# Patient Record
Sex: FEMALE | Race: WHITE | Employment: FULL TIME | URBAN - METROPOLITAN AREA
[De-identification: names, ages, dates, MRNs, and addresses within clinical notes are randomized per-mention and may not be internally consistent; named-entity substitution may affect disease eponyms.]

---

## 2017-03-31 ENCOUNTER — TRANSFERRED RECORDS (OUTPATIENT)
Dept: HEALTH INFORMATION MANAGEMENT | Facility: CLINIC | Age: 62
End: 2017-03-31

## 2017-04-14 ENCOUNTER — TELEPHONE (OUTPATIENT)
Dept: FAMILY MEDICINE | Facility: CLINIC | Age: 62
End: 2017-04-14

## 2017-04-14 NOTE — TELEPHONE ENCOUNTER
Reason for Call:  Other call back    Detailed comments: Patient called stating that she needs a referral to see a Urologist from her primary provider in order for her insurance to cover it. She went to an OBGYN at the [facility] and saw Charis Strong who then referred her to Rosalba Acuña an Urologist at [facility]. Please call patient with answer on if she can get that referral or any other questions.     Phone Number Patient can be reached at: Cell number on file:    Telephone Information:   Mobile [phone]       Best Time: Anytime    Can we leave a detailed message on this number? YES    Call taken on 4/14/2017 at 3:54 PM by Melissa Person

## 2017-04-14 NOTE — TELEPHONE ENCOUNTER
Routing to referral specialist.    Shae Nava-Please review and follow up with patient.    Thank you!  PERCY CastanedaN, RN

## 2017-04-18 NOTE — TELEPHONE ENCOUNTER
Spoke with pt and stated Dr Rosalba Acuña at [facility] is out of network. Spoke with Dr Charis Saenz nurse at [facility] [place] and recommended Dr Minnie Bailey at [facility] [address] [place] [address] [address] [facility] [address] [phone] or fax number [phone]. Pt will cancel her [facility] appt and make an appt with Dr Bailey. Pt will call back if she needs an Order placed by Dr Will.

## 2017-06-26 ENCOUNTER — OFFICE VISIT (OUTPATIENT)
Dept: FAMILY MEDICINE | Facility: CLINIC | Age: 62
End: 2017-06-26
Payer: COMMERCIAL

## 2017-06-26 VITALS
RESPIRATION RATE: 12 BRPM | HEIGHT: 68 IN | WEIGHT: 139.25 LBS | HEART RATE: 81 BPM | TEMPERATURE: 98.1 F | OXYGEN SATURATION: 100 % | BODY MASS INDEX: 21.1 KG/M2 | DIASTOLIC BLOOD PRESSURE: 64 MMHG | SYSTOLIC BLOOD PRESSURE: 104 MMHG

## 2017-06-26 DIAGNOSIS — Z01.818 PREOP GENERAL PHYSICAL EXAM: Primary | ICD-10-CM

## 2017-06-26 DIAGNOSIS — J31.0 CHRONIC RHINITIS, UNSPECIFIED TYPE: ICD-10-CM

## 2017-06-26 DIAGNOSIS — L30.9 VULVAR DERMATITIS: ICD-10-CM

## 2017-06-26 DIAGNOSIS — N39.46 MIXED INCONTINENCE URGE AND STRESS (MALE)(FEMALE): ICD-10-CM

## 2017-06-26 DIAGNOSIS — N81.89 PELVIC FLOOR RELAXATION: ICD-10-CM

## 2017-06-26 LAB — HGB BLD-MCNC: 13 G/DL (ref 11.7–15.7)

## 2017-06-26 PROCEDURE — 36415 COLL VENOUS BLD VENIPUNCTURE: CPT | Performed by: FAMILY MEDICINE

## 2017-06-26 PROCEDURE — 85018 HEMOGLOBIN: CPT | Performed by: FAMILY MEDICINE

## 2017-06-26 PROCEDURE — 99214 OFFICE O/P EST MOD 30 MIN: CPT | Performed by: FAMILY MEDICINE

## 2017-06-26 RX ORDER — FLUTICASONE PROPIONATE 50 MCG
1-2 SPRAY, SUSPENSION (ML) NASAL DAILY
Qty: 16 G | Refills: 3 | Status: ON HOLD | OUTPATIENT
Start: 2017-06-26 | End: 2017-07-21

## 2017-06-26 RX ORDER — CLOBETASOL PROPIONATE 0.5 MG/G
CREAM TOPICAL
Qty: 30 G | Refills: 3 | Status: SHIPPED | OUTPATIENT
Start: 2017-06-26 | End: 2017-06-26

## 2017-06-26 RX ORDER — CLOBETASOL PROPIONATE 0.5 MG/G
CREAM TOPICAL
Qty: 30 G | Refills: 3 | Status: SHIPPED | OUTPATIENT
Start: 2017-06-26 | End: 2017-07-08

## 2017-06-26 NOTE — MR AVS SNAPSHOT
After Visit Summary   6/26/2017    Raven Mahmood    MRN: [id number]           Patient Information     Date Of Birth          1955        Visit Information        Provider Department      6/26/2017 9:20 AM Roshni Will MD [facility]        Today's Diagnoses     Preop general physical exam    -  1    Pelvic floor relaxation        Vulvar dermatitis        Chronic rhinitis, unspecified type          Care Instructions      Before Your Surgery      Call your surgeon if there is any change in your health. This includes signs of a cold or flu (such as a sore throat, runny nose, cough, rash or fever).    Do not smoke, drink alcohol or take over the counter medicine (unless your surgeon or primary care doctor tells you to) for the 24 hours before and after surgery.    If you take prescribed drugs: Follow your doctor s orders about which medicines to take and which to stop until after surgery.    Eating and drinking prior to surgery: follow the instructions from your surgeon    Take a shower or bath the night before surgery. Use the soap your surgeon gave you to gently clean your skin. If you do not have soap from your surgeon, use your regular soap. Do not shave or scrub the surgery site.  Wear clean pajamas and have clean sheets on your bed.           Follow-ups after your visit        Your next 10 appointments already scheduled     Jul 21, 2017   Procedure with Payam Ceballos MD   [facility] (--)    [address]  [facility] [id number] [phone]              Who to contact     If you have questions or need follow up information about today's clinic visit or your schedule please contact [facility] directly at [phone].  Normal or non-critical lab and imaging results will be communicated to you by MyChart, letter or phone within 4 business days after the clinic has received the results. If you do not hear from us within 7  "days, please contact the clinic through [employer] or phone. If you have a critical or abnormal lab result, we will notify you by phone as soon as possible.  Submit refill requests through [employer] or call your pharmacy and they will forward the refill request to us. Please allow 3 business days for your refill to be completed.          Additional Information About Your Visit        [employer] Information     [employer] gives you secure access to your electronic health record. If you see a primary care provider, you can also send messages to your care team and make appointments. If you have questions, please call your primary care clinic.  If you do not have a primary care provider, please call [phone] and they will assist you.        Care EveryWhere ID     This is your Care EveryWhere ID. This could be used by other organizations to access your [place] medical records  [id number]        Your Vitals Were     Pulse Temperature Respirations Height Last Period Pulse Oximetry    81 98.1  F (36.7  C) (Oral) 12 5' 8\" (1.727 m) 09/01/2005 100%    Breastfeeding? BMI (Body Mass Index)                No 21.17 kg/m2           Blood Pressure from Last 3 Encounters:   06/26/17 104/64   12/01/16 95/63   11/10/16 108/66    Weight from Last 3 Encounters:   06/26/17 139 lb 4 oz (63.2 kg)   11/10/16 140 lb (63.5 kg)   07/08/16 136 lb (61.7 kg)              We Performed the Following     Hemoglobin          Today's Medication Changes          These changes are accurate as of: 6/26/17 10:22 AM.  If you have any questions, ask your nurse or doctor.               Start taking these medicines.        Dose/Directions    clobetasol 0.05 % cream   Commonly known as:  TEMOVATE   Used for:  Vulvar dermatitis   Started by:  Roshni Will MD        Apply  topically 2 times daily.   Quantity:  30 g   Refills:  3         Stop taking these medicines if you haven't already. Please contact your care team if you have questions.     CREAM BASE EX "   Stopped by:  Roshni Will MD           omeprazole 20 MG CR capsule   Commonly known as:  priLOSEC   Stopped by:  Roshni Will MD                Where to get your medicines      These medications were sent to [facility] [address] [facility] [address] AT [facility] [address], [facility] [address]     Phone:  [phone]     fluticasone 50 MCG/ACT spray         Call your pharmacy to confirm that your medication is ready for pickup. It may take up to 24 hours for them to receive the prescription. If the prescription is not ready within 3 business days, please contact your clinic or your provider.     We will let you know when these medications are ready. If you don't hear back within 3 business days, please contact us.     clobetasol 0.05 % cream                Primary Care Provider Office Phone # Fax #    Roshni Will [phone]       [facility] [address]  [facility] [address]        Equal Access to Services     [facility]: Hadii radha ku hadasho Soomaali, waaxda luqadaha, qaybta kaalmada aderuy, marybeth cerrato . So [place] -[phone].    ATENCIÓN: Si habla español, tiene a jones disposición servicios gratuitos de asistencia lingüística. Galo al [phone].    We comply with applicable federal civil rights laws and [place] laws. We do not discriminate on the basis of race, color, national origin, age, disability sex, sexual orientation or gender identity.            Thank you!     Thank you for choosing [facility]  for your care. Our goal is always to provide you with excellent care. Hearing back from our patients is one way we can continue to improve our services. Please take a few minutes to complete the written survey that you may receive in the mail after your visit with us. Thank you!             Your Updated Medication List - Protect others around you: Learn how  to safely use, store and throw away your medicines at www.disposemymeds.org.          This list is accurate as of: 6/26/17 10:22 AM.  Always use your most recent med list.                   Brand Name Dispense Instructions for use Diagnosis    clobetasol 0.05 % cream    TEMOVATE    30 g    Apply  topically 2 times daily.    Vulvar dermatitis       fluticasone 50 MCG/ACT spray    FLONASE    16 g    Spray 1-2 sprays into both nostrils daily    Chronic rhinitis, unspecified type

## 2017-06-26 NOTE — PROGRESS NOTES
[facility]  [address] [place] [id number] [phone]  Dept: [phone]    PRE-OP EVALUATION:  Today's date: 2017    Raven Mahmood (: 1955) presents for pre-operative evaluation assessment as requested by Payam Andrea MD (Primary), Shae Orozco MD (assisting) .  She requires evaluation and anesthesia risk assessment prior to undergoing surgery/procedure for treatment of prolapsed uterus.  Proposed procedure: ROBOTIC ASSISTED SUPRACERVICAL LAPAROSCOPIC HYSTERECTOMY, BILATERAL SALPINGO-OOPHORECTOMIES (DR. WINSTON) ROBOTIC ASSISTED SACROCOLPOPEXY, MIDURETHRAL SLING, CYSTOSCOPY (SANJIV)    Date of Surgery/ Procedure: 2017  Time of Surgery/ Procedure: 1:00 PM  Hospital/Surgical Facility: [facility]  Fax number for surgical facility: n/a  Primary Physician: Roshni Will  Type of Anesthesia Anticipated: General    Patient has a Health Care Directive or Living Will:  YES,  only    Preop Questions 2017   1.  Do you have a history of heart attack, stroke, stent, bypass or surgery on an artery in the head, neck, heart or legs? No   2.  Do you ever have any pain or discomfort in your chest? No   3.  Do you have a history of  Heart Failure? No   4.   Are you troubled by shortness of breath when:  walking on a level surface, or up a slight hill, or at night? No   5.  Do you currently have a cold, bronchitis or other respiratory infection? No   6.  Do you have a cough, shortness of breath, or wheezing? No   7.  Do you sometimes get pains in the calves of your legs when you walk? No   8. Do you or anyone in your family have previous history of blood clots? No   9.  Do you or does anyone in your family have a serious bleeding problem such as prolonged bleeding following surgeries or cuts? No   10. Have you ever had problems with anemia or been told to take iron pills? No   11. Have you had any abnormal blood loss such as black,  tarry or bloody stools, or abnormal vaginal bleeding? No   12. Have you ever had a blood transfusion? No   13. Have you or any of your relatives ever had problems with anesthesia? No   14. Do you have sleep apnea, excessive snoring or daytime drowsiness? No   15. Do you have any prosthetic heart valves? No   16. Do you have prosthetic joints? No   17. Is there any chance that you may be pregnant? No         HPI:                                                      Brief HPI related to upcoming procedure: has tried pessaries,       See problem list for active medical problems.  Problems all longstanding and stable, except as noted/documented.  See ROS for pertinent symptoms related to these conditions.                                                                                                  .    MEDICAL HISTORY:                                                      Patient Active Problem List    Diagnosis Date Noted     Pelvic floor relaxation      Priority: Medium     pessary       Chronic rhinitis 07/08/2016     Priority: Medium     Mucous polyp of cervix      Priority: Medium     cervical polyp 12:00, seen by GYN       Elizabeth      Priority: Medium     DEXA       Asymptomatic postmenopausal status 08/03/2006     Priority: Medium     Problem list name updated by automated process. Provider to review       Mixed incontinence urge and stress (male)(female) 08/03/2006     Priority: Medium      Past Medical History:   Diagnosis Date     Dysplasia of cervix, unspecified     cervical dysplasia - in [facility]     Female stress incontinence     mild stress incontinence     Hemorrhoids      Irritable bowel syndrome      Mucous polyp of cervix     cervical polyp 12:00, seen by GYN     Osteopenia 8/06    DEXA     Other specified temporomandibular joint disorders      Pelvic floor relaxation     pessary     Tinnitus 11/06    Dr. Conti     Past Surgical History:   Procedure Laterality Date     COLONOSCOPY  12/17/07     WNL, sister with polyps     COLONOSCOPY  1/17/2013    Procedure: COLONOSCOPY;  COLONOSCOPY;  Surgeon: Roland Gee MD;  Location:  GI     ESOPHAGOSCOPY, GASTROSCOPY, DUODENOSCOPY (EGD), COMBINED N/A 12/1/2016    Procedure: COMBINED ESOPHAGOSCOPY, GASTROSCOPY, DUODENOSCOPY (EGD), BIOPSY SINGLE OR MULTIPLE;  Surgeon: Sergio Nguyễn MD;  Location:  GI     STRESS ECHO (METRO)  5/03    negative, at [facility]     Current Outpatient Prescriptions   Medication Sig Dispense Refill     fluticasone (FLONASE) 50 MCG/ACT spray Spray 1-2 sprays into both nostrils daily 16 g 3     clobetasol (TEMOVATE) 0.05 % cream Apply  topically 2 times daily. 30 g 3     OTC products: None, except as noted above    Allergies   Allergen Reactions     Erythromycin GI Disturbance     Penicillins Swelling and Rash     Tetracycline Rash      Latex Allergy: NO    Social History   Substance Use Topics     Smoking status: Never Smoker     Smokeless tobacco: Never Used     Alcohol use Yes      Comment: 2 drinks a week     History   Drug Use No       REVIEW OF SYSTEMS:                                                      CONST: NEGATIVE for fevers/chills/sweats, unexplained weight loss/gain, and fatigue  EYES: NEGATIVE for change in vision  ENT: NEGATIVE for difficulty hearing/tinnitus, and problems with teeth/gums  BREAST: NEGATIVE for breast lump/discharge  CV: NEGATIVE for chest pain/discomfort, leg pain with exercise, and palpitations  RESP: NEGATIVE for cough/wheeze, and difficulty breathing  GI: NEGATIVE for abdominal pain, blood in bowel movement, and nausea/vomiting/diarrhea  : NEGATIVE for nighttime urination, unusual vaginal bleeding, penile/vaginal discharge, and concerns about sexual function and POSITIVE for leaking urine  MS: NEGATIVE for muscle/joint pain  SKIN: NEGATIVE for rash or mole change  NEURO: POSITIVE for headaches, NEGATIVE for dizziness/lightheadedness, numbness, memory loss, and loss of  "coordination  PSYCH: NEGATIVE for anxiety/stress, problems with sleep, and depression  HEME: NEGATIVE for unexplained lumps, and easy bruising/bleeding  ENDO: NEGATIVE for excessive thirst or urination  ALL: NEGATIVE for hay fever/allergies     EXAM:                                                    /64 (BP Location: Right arm, Patient Position: Chair, Cuff Size: Adult Regular)  Pulse 81  Temp 98.1  F (36.7  C) (Oral)  Resp 12  Ht 5' 8\" (1.727 m)  Wt 139 lb 4 oz (63.2 kg)  LMP 09/01/2005  SpO2 100%  Breastfeeding? No  BMI 21.17 kg/m2  GEN:  no apparent distress  EYES: PERRL, conjunctivae and sclerae clear   ENT: external ears and nose without lesions or scars, TM's and canals clear bilaterally, oropharynx clear with moist mucus membranes and normal landmarks and lips, teeth, and gums with no abnormalities noted   NECK:  Supple without adenopathy, mass, or thyromegaly   LUNGS:  normal respiratory effort, and lungs clear to auscultation bilaterally - no rales, rhonchi or wheezes  CV: regular rate and rhythm, normal S1 S2, no S3 or S4, no murmur, click or rub and bilateral lower extremities without edema   BREASTS: normal without masses, tenderness or nipple discharge and no palpable axillary masses or adenopathy   ABD:  soft, nontender, no mass, no hepatosplenomegaly, no hernias  SKIN:  normal to inspection and palpation, no rashes or abnormal-appearing lesions   PSYCH: mood/affect appear normal/bright     DIAGNOSTICS:                                                    EKG: Not indicated due to non-vascular surgery and low risk of event (age <65 and without cardiac risk factors)    Results for orders placed or performed in visit on 06/26/17   Hemoglobin   Result Value Ref Range    Hemoglobin 13.0 11.7 - 15.7 g/dL        Recent Labs   Lab Test  11/10/16   0953  05/30/10   1930   HGB  13.0  12.6   PLT  193  182   NA  144  140   POTASSIUM  4.2  4.0   CR  0.81  0.87        IMPRESSION:                       "                              Reason for surgery/procedure: symptomatic pelvic relaxation  Diagnosis/reason for consult: preoperative assessment    The proposed surgical procedure is considered INTERMEDIATE risk.    REVISED CARDIAC RISK INDEX  The patient has the following serious cardiovascular risks for perioperative complications such as (MI, PE, VFib and 3  AV Block):  No serious cardiac risks  INTERPRETATION: 0 risks: Class I (very low risk - 0.4% complication rate)    The patient has the following additional risks for perioperative complications:  No identified additional risks      ICD-10-CM    1. Preop general physical exam Z01.818 Hemoglobin   2. Pelvic floor relaxation N81.89 Hemoglobin   3. Mixed incontinence urge and stress (male)(female) N39.46 Hemoglobin   4. Chronic rhinitis, unspecified type J31.0 fluticasone (FLONASE) 50 MCG/ACT spray   5. Vulvar dermatitis N76.89 clobetasol (TEMOVATE) 0.05 % cream     DISCONTINUED: clobetasol (TEMOVATE) 0.05 % cream       RECOMMENDATIONS:                                                          APPROVAL GIVEN to proceed with proposed procedure, without further diagnostic evaluation       Signed Electronically by: Roshni Will MD    Copy of this evaluation report is provided to requesting physician.    June Preop Guidelines

## 2017-06-26 NOTE — NURSING NOTE
"Chief Complaint   Patient presents with     Pre-Op Exam       Initial /64 (BP Location: Right arm, Patient Position: Chair, Cuff Size: Adult Regular)  Pulse 81  Temp 98.1  F (36.7  C) (Oral)  Resp 12  Ht 5' 8\" (1.727 m)  Wt 139 lb 4 oz (63.2 kg)  LMP 09/01/2005  SpO2 100%  Breastfeeding? No  BMI 21.17 kg/m2 Estimated body mass index is 21.17 kg/(m^2) as calculated from the following:    Height as of this encounter: 5' 8\" (1.727 m).    Weight as of this encounter: 139 lb 4 oz (63.2 kg).  Medication Reconciliation: complete     Cookie Brewer MA      "

## 2017-06-28 ENCOUNTER — TELEPHONE (OUTPATIENT)
Dept: FAMILY MEDICINE | Facility: CLINIC | Age: 62
End: 2017-06-28

## 2017-06-28 ENCOUNTER — MYC MEDICAL ADVICE (OUTPATIENT)
Dept: FAMILY MEDICINE | Facility: CLINIC | Age: 62
End: 2017-06-28

## 2017-06-28 DIAGNOSIS — L30.9 VULVAR DERMATITIS: Primary | ICD-10-CM

## 2017-06-28 DIAGNOSIS — Z01.818 PRE-OP EXAM: Primary | ICD-10-CM

## 2017-06-28 DIAGNOSIS — N81.89 PELVIC FLOOR RELAXATION: ICD-10-CM

## 2017-06-28 NOTE — TELEPHONE ENCOUNTER
OK, it sounds like her surgeon is requesting preop EKG.  I put order in.  She can schedule nurse-only appointment for that.  Roshni Will MD

## 2017-06-28 NOTE — TELEPHONE ENCOUNTER
Started PA on covermymeds. Com    Key code: [id number]    It may take 1-5 business day to get approved or denied.

## 2017-06-28 NOTE — TELEPHONE ENCOUNTER
Received Fax from [facility] with the following message: Patient's pharmacy coverage with [employer] has , contact insurance plan at the number on the back of patient's most current insurance card.    Called number on the back of insurance card, representative stated patient has active insurance coverage, representative stated PA must be submitted via [employer] @ [phone].  Please Call [employer] to initiate PA.      Cecile Lei, CMA

## 2017-06-28 NOTE — TELEPHONE ENCOUNTER
Reason for Call: Request for an order or referral:    Order or referral being requested:EKG    Date needed: at your convenience    Has the patient been seen by the PCP for this problem? YES    Additional comments: Pt stated she saw Dr. Will for a preop recently 6/26. During the visit they had talked about an EKG not being necessary. Pt was reviewing her print outs and it said that she does need to have one done. Please see if Dr. Will can place an order for an EKG and please follow up with pt to schedule an appointment when the order has been placed, thanks!    Phone number Patient can be reached at:  Home number on file [phone] (home)    Best Time:  anytime    Can we leave a detailed message on this number?  YES    Call taken on 6/28/2017 at 2:12 PM by Shae Braswell

## 2017-06-28 NOTE — TELEPHONE ENCOUNTER
--Please see patient's request in this encounter.      --  --Please call patient and ask schedule EKG when order placed.     Thank you,  Ivette Arzola RN

## 2017-06-29 NOTE — TELEPHONE ENCOUNTER
Pa sent through [facility]. Copied for chart.   Your information has been submitted to [employer].  If [employer] has not responded in 3-5 business days or if you have any questions about your PA submission, contact [employer] at [phone]      [employer] (Key: [id number])  Clobetasol Propionate 0.05% cream  Status: PA Request  Sent: June 29th, 2017    Hitesh Pepe MA

## 2017-06-30 ENCOUNTER — TELEPHONE (OUTPATIENT)
Dept: FAMILY MEDICINE | Facility: CLINIC | Age: 62
End: 2017-06-30

## 2017-06-30 ENCOUNTER — OFFICE VISIT (OUTPATIENT)
Dept: NURSING | Facility: CLINIC | Age: 62
End: 2017-06-30
Payer: COMMERCIAL

## 2017-06-30 DIAGNOSIS — Z00.8 ENCOUNTER FOR ELECTROCARDIOGRAM: Primary | ICD-10-CM

## 2017-06-30 DIAGNOSIS — Z01.818 PRE-OP EXAM: ICD-10-CM

## 2017-06-30 DIAGNOSIS — N81.89 PELVIC FLOOR RELAXATION: ICD-10-CM

## 2017-06-30 PROCEDURE — 93000 ELECTROCARDIOGRAM COMPLETE: CPT

## 2017-06-30 PROCEDURE — 99207 ZZC NO CHARGE NURSE ONLY: CPT

## 2017-06-30 NOTE — TELEPHONE ENCOUNTER
Reason for Call:  Form, our goal is to have forms completed with 72 hours, however, some forms may require a visit or additional information.    Type of letter, form or note:  medical    Who is the form from?: [employer] (if other please explain)    Where did the form come from: form was mailed in    What clinic location was the form placed at?: [facility]    Where the form was placed: 's Box    What number is listed as a contact on the form?: N/A       Additional comments: Fax to [phone]    Call taken on 6/30/2017 at 9:44 AM by Melissa Lundberg

## 2017-06-30 NOTE — NURSING NOTE
EKG was performed by Gordon Ledesma MA and result was review by Liana Spangler PA-C. Results normal.     Gordon Ledesma MA

## 2017-06-30 NOTE — TELEPHONE ENCOUNTER
"Surgeons should be able to visualized EKG through [employer] system - we can see their preop already.  Thanks  Liana \"Vishnu\" VICTOR M Spangler   "

## 2017-06-30 NOTE — TELEPHONE ENCOUNTER
EKG was performed by Gordon Ledesma MA and result was review by Liana Spangler PA-C. Result was normal clear for surgery.      Not sure if these results will need to be faxed with pre-op visit to JAYDEN Reynaga again.         Gordon Ledesma MA

## 2017-06-30 NOTE — PROGRESS NOTES
"Results discussed with patient at time of visit. No acute changes or concerns for proposed surgery.  Liana \"Vishnu\" VICTOR M Spangler"

## 2017-07-07 RX ORDER — HALOBETASOL PROPIONATE 0.5 MG/G
CREAM TOPICAL 2 TIMES DAILY
Qty: 50 G | Refills: 1 | Status: CANCELLED | OUTPATIENT
Start: 2017-07-07

## 2017-07-07 NOTE — TELEPHONE ENCOUNTER
Dr. Will asked writer to contact patient and ask patient if she wants to try either of the following high potency creams: Halobetasol or Augmented Betamethasone?      Prior authorization for Clobetasol propionate denied.    If so, med to be pended and encounter sent back to Dr. Will.    Writer called patient and explained prior auth denial and other options on formulary.    Patient verbalized understanding and stated she will try either on-formulary option, whichever one is closest to dose she would use for clobetasol propionate.    Patient aware Dr. Will back in clinic on 7/10/17.    Pharmacy confirmed with patient.    Prior auth denial letter placed in [employer] abstraction basket.    Dr. Will-Please review.  Writer pended both medications.    Thank you!  ADRIA Castro, PERCYN, RN

## 2017-07-08 RX ORDER — BETAMETHASONE DIPROPIONATE 0.5 MG/G
CREAM TOPICAL
Qty: 50 G | Refills: 0 | Status: ON HOLD | OUTPATIENT
Start: 2017-07-08 | End: 2017-07-21

## 2017-07-10 NOTE — TELEPHONE ENCOUNTER
I called patient to let her know script sent and she says she did already get a call.    Ivette Arzola RN

## 2017-07-18 PROBLEM — N81.4 UTERINE PROLAPSE: Status: ACTIVE | Noted: 2017-07-18

## 2017-07-18 PROBLEM — N81.11 MIDLINE CYSTOCELE: Status: ACTIVE | Noted: 2017-07-18

## 2017-07-18 NOTE — H&P (VIEW-ONLY)
[facility]  [address] [place] [id number] [phone]  Dept: [phone]    PRE-OP EVALUATION:  Today's date: 2017    Raven Mahmood (: 1955) presents for pre-operative evaluation assessment as requested by Payam Andrea MD (Primary), Shae Orozco MD (assisting) .  She requires evaluation and anesthesia risk assessment prior to undergoing surgery/procedure for treatment of prolapsed uterus.  Proposed procedure: ROBOTIC ASSISTED SUPRACERVICAL LAPAROSCOPIC HYSTERECTOMY, BILATERAL SALPINGO-OOPHORECTOMIES (DR. WINSTON) ROBOTIC ASSISTED SACROCOLPOPEXY, MIDURETHRAL SLING, CYSTOSCOPY (SANJIV)    Date of Surgery/ Procedure: 2017  Time of Surgery/ Procedure: 1:00 PM  Hospital/Surgical Facility: [facility]  Fax number for surgical facility: n/a  Primary Physician: Roshni Will  Type of Anesthesia Anticipated: General    Patient has a Health Care Directive or Living Will:  YES,  only    Preop Questions 2017   1.  Do you have a history of heart attack, stroke, stent, bypass or surgery on an artery in the head, neck, heart or legs? No   2.  Do you ever have any pain or discomfort in your chest? No   3.  Do you have a history of  Heart Failure? No   4.   Are you troubled by shortness of breath when:  walking on a level surface, or up a slight hill, or at night? No   5.  Do you currently have a cold, bronchitis or other respiratory infection? No   6.  Do you have a cough, shortness of breath, or wheezing? No   7.  Do you sometimes get pains in the calves of your legs when you walk? No   8. Do you or anyone in your family have previous history of blood clots? No   9.  Do you or does anyone in your family have a serious bleeding problem such as prolonged bleeding following surgeries or cuts? No   10. Have you ever had problems with anemia or been told to take iron pills? No   11. Have you had any abnormal blood loss such as black,  tarry or bloody stools, or abnormal vaginal bleeding? No   12. Have you ever had a blood transfusion? No   13. Have you or any of your relatives ever had problems with anesthesia? No   14. Do you have sleep apnea, excessive snoring or daytime drowsiness? No   15. Do you have any prosthetic heart valves? No   16. Do you have prosthetic joints? No   17. Is there any chance that you may be pregnant? No         HPI:                                                      Brief HPI related to upcoming procedure: has tried pessaries,       See problem list for active medical problems.  Problems all longstanding and stable, except as noted/documented.  See ROS for pertinent symptoms related to these conditions.                                                                                                  .    MEDICAL HISTORY:                                                      Patient Active Problem List    Diagnosis Date Noted     Pelvic floor relaxation      Priority: Medium     pessary       Chronic rhinitis 07/08/2016     Priority: Medium     Mucous polyp of cervix      Priority: Medium     cervical polyp 12:00, seen by GYN       Elizabeth      Priority: Medium     DEXA       Asymptomatic postmenopausal status 08/03/2006     Priority: Medium     Problem list name updated by automated process. Provider to review       Mixed incontinence urge and stress (male)(female) 08/03/2006     Priority: Medium      Past Medical History:   Diagnosis Date     Dysplasia of cervix, unspecified     cervical dysplasia - in [facility]     Female stress incontinence     mild stress incontinence     Hemorrhoids      Irritable bowel syndrome      Mucous polyp of cervix     cervical polyp 12:00, seen by GYN     Osteopenia 8/06    DEXA     Other specified temporomandibular joint disorders      Pelvic floor relaxation     pessary     Tinnitus 11/06    Dr. Conti     Past Surgical History:   Procedure Laterality Date     COLONOSCOPY  12/17/07     WNL, sister with polyps     COLONOSCOPY  1/17/2013    Procedure: COLONOSCOPY;  COLONOSCOPY;  Surgeon: Roland Gee MD;  Location:  GI     ESOPHAGOSCOPY, GASTROSCOPY, DUODENOSCOPY (EGD), COMBINED N/A 12/1/2016    Procedure: COMBINED ESOPHAGOSCOPY, GASTROSCOPY, DUODENOSCOPY (EGD), BIOPSY SINGLE OR MULTIPLE;  Surgeon: Sergio Nguyễn MD;  Location:  GI     STRESS ECHO (METRO)  5/03    negative, at [facility]     Current Outpatient Prescriptions   Medication Sig Dispense Refill     fluticasone (FLONASE) 50 MCG/ACT spray Spray 1-2 sprays into both nostrils daily 16 g 3     clobetasol (TEMOVATE) 0.05 % cream Apply  topically 2 times daily. 30 g 3     OTC products: None, except as noted above    Allergies   Allergen Reactions     Erythromycin GI Disturbance     Penicillins Swelling and Rash     Tetracycline Rash      Latex Allergy: NO    Social History   Substance Use Topics     Smoking status: Never Smoker     Smokeless tobacco: Never Used     Alcohol use Yes      Comment: 2 drinks a week     History   Drug Use No       REVIEW OF SYSTEMS:                                                      CONST: NEGATIVE for fevers/chills/sweats, unexplained weight loss/gain, and fatigue  EYES: NEGATIVE for change in vision  ENT: NEGATIVE for difficulty hearing/tinnitus, and problems with teeth/gums  BREAST: NEGATIVE for breast lump/discharge  CV: NEGATIVE for chest pain/discomfort, leg pain with exercise, and palpitations  RESP: NEGATIVE for cough/wheeze, and difficulty breathing  GI: NEGATIVE for abdominal pain, blood in bowel movement, and nausea/vomiting/diarrhea  : NEGATIVE for nighttime urination, unusual vaginal bleeding, penile/vaginal discharge, and concerns about sexual function and POSITIVE for leaking urine  MS: NEGATIVE for muscle/joint pain  SKIN: NEGATIVE for rash or mole change  NEURO: POSITIVE for headaches, NEGATIVE for dizziness/lightheadedness, numbness, memory loss, and loss of  "coordination  PSYCH: NEGATIVE for anxiety/stress, problems with sleep, and depression  HEME: NEGATIVE for unexplained lumps, and easy bruising/bleeding  ENDO: NEGATIVE for excessive thirst or urination  ALL: NEGATIVE for hay fever/allergies     EXAM:                                                    /64 (BP Location: Right arm, Patient Position: Chair, Cuff Size: Adult Regular)  Pulse 81  Temp 98.1  F (36.7  C) (Oral)  Resp 12  Ht 5' 8\" (1.727 m)  Wt 139 lb 4 oz (63.2 kg)  LMP 09/01/2005  SpO2 100%  Breastfeeding? No  BMI 21.17 kg/m2  GEN:  no apparent distress  EYES: PERRL, conjunctivae and sclerae clear   ENT: external ears and nose without lesions or scars, TM's and canals clear bilaterally, oropharynx clear with moist mucus membranes and normal landmarks and lips, teeth, and gums with no abnormalities noted   NECK:  Supple without adenopathy, mass, or thyromegaly   LUNGS:  normal respiratory effort, and lungs clear to auscultation bilaterally - no rales, rhonchi or wheezes  CV: regular rate and rhythm, normal S1 S2, no S3 or S4, no murmur, click or rub and bilateral lower extremities without edema   BREASTS: normal without masses, tenderness or nipple discharge and no palpable axillary masses or adenopathy   ABD:  soft, nontender, no mass, no hepatosplenomegaly, no hernias  SKIN:  normal to inspection and palpation, no rashes or abnormal-appearing lesions   PSYCH: mood/affect appear normal/bright     DIAGNOSTICS:                                                    EKG: Not indicated due to non-vascular surgery and low risk of event (age <65 and without cardiac risk factors)    Results for orders placed or performed in visit on 06/26/17   Hemoglobin   Result Value Ref Range    Hemoglobin 13.0 11.7 - 15.7 g/dL        Recent Labs   Lab Test  11/10/16   0953  05/30/10   1930   HGB  13.0  12.6   PLT  193  182   NA  144  140   POTASSIUM  4.2  4.0   CR  0.81  0.87        IMPRESSION:                       "                              Reason for surgery/procedure: symptomatic pelvic relaxation  Diagnosis/reason for consult: preoperative assessment    The proposed surgical procedure is considered INTERMEDIATE risk.    REVISED CARDIAC RISK INDEX  The patient has the following serious cardiovascular risks for perioperative complications such as (MI, PE, VFib and 3  AV Block):  No serious cardiac risks  INTERPRETATION: 0 risks: Class I (very low risk - 0.4% complication rate)    The patient has the following additional risks for perioperative complications:  No identified additional risks      ICD-10-CM    1. Preop general physical exam Z01.818 Hemoglobin   2. Pelvic floor relaxation N81.89 Hemoglobin   3. Mixed incontinence urge and stress (male)(female) N39.46 Hemoglobin   4. Chronic rhinitis, unspecified type J31.0 fluticasone (FLONASE) 50 MCG/ACT spray   5. Vulvar dermatitis N76.89 clobetasol (TEMOVATE) 0.05 % cream     DISCONTINUED: clobetasol (TEMOVATE) 0.05 % cream       RECOMMENDATIONS:                                                          APPROVAL GIVEN to proceed with proposed procedure, without further diagnostic evaluation       Signed Electronically by: Roshni Will MD    Copy of this evaluation report is provided to requesting physician.    June Preop Guidelines

## 2017-07-20 RX ORDER — CLOBETASOL PROPIONATE 0.5 MG/G
CREAM TOPICAL 2 TIMES DAILY
Status: ON HOLD | COMMUNITY
End: 2017-07-21

## 2017-07-21 ENCOUNTER — HOSPITAL ENCOUNTER (OUTPATIENT)
Facility: CLINIC | Age: 62
Discharge: HOME | End: 2017-07-22
Attending: OBSTETRICS & GYNECOLOGY | Admitting: OBSTETRICS & GYNECOLOGY
Payer: COMMERCIAL

## 2017-07-21 ENCOUNTER — ANESTHESIA EVENT (OUTPATIENT)
Dept: SURGERY | Facility: CLINIC | Age: 62
End: 2017-07-21
Payer: COMMERCIAL

## 2017-07-21 ENCOUNTER — ANESTHESIA (OUTPATIENT)
Dept: SURGERY | Facility: CLINIC | Age: 62
End: 2017-07-21
Payer: COMMERCIAL

## 2017-07-21 DIAGNOSIS — N81.4 UTERINE PROLAPSE: Primary | ICD-10-CM

## 2017-07-21 LAB
ABO + RH BLD: NORMAL
ABO + RH BLD: NORMAL
BLD GP AB SCN SERPL QL: NORMAL
BLOOD BANK CMNT PATIENT-IMP: NORMAL
ERYTHROCYTE [DISTWIDTH] IN BLOOD BY AUTOMATED COUNT: 13.2 % (ref 10–15)
HCT VFR BLD AUTO: 37.6 % (ref 35–47)
HGB BLD-MCNC: 12.8 G/DL (ref 11.7–15.7)
MCH RBC QN AUTO: 32.7 PG (ref 26.5–33)
MCHC RBC AUTO-ENTMCNC: 34 G/DL (ref 31.5–36.5)
MCV RBC AUTO: 96 FL (ref 78–100)
PLATELET # BLD AUTO: 210 10E9/L (ref 150–450)
RBC # BLD AUTO: 3.91 10E12/L (ref 3.8–5.2)
SPECIMEN EXP DATE BLD: NORMAL
WBC # BLD AUTO: 3.9 10E9/L (ref 4–11)

## 2017-07-21 PROCEDURE — 36415 COLL VENOUS BLD VENIPUNCTURE: CPT | Performed by: OBSTETRICS & GYNECOLOGY

## 2017-07-21 PROCEDURE — 88305 TISSUE EXAM BY PATHOLOGIST: CPT | Performed by: OBSTETRICS & GYNECOLOGY

## 2017-07-21 PROCEDURE — 25000128 H RX IP 250 OP 636: Performed by: NURSE ANESTHETIST, CERTIFIED REGISTERED

## 2017-07-21 PROCEDURE — 27210794 ZZH OR GENERAL SUPPLY STERILE: Performed by: OBSTETRICS & GYNECOLOGY

## 2017-07-21 PROCEDURE — 88307 TISSUE EXAM BY PATHOLOGIST: CPT | Mod: 26 | Performed by: OBSTETRICS & GYNECOLOGY

## 2017-07-21 PROCEDURE — 25000125 ZZHC RX 250: Performed by: UROLOGY

## 2017-07-21 PROCEDURE — 40000935 ZZH STATISTIC OUTPATIENT (NON-OBS) EVE

## 2017-07-21 PROCEDURE — 36000087 ZZH SURGERY LEVEL 8 EA 15 ADDTL MIN: Performed by: OBSTETRICS & GYNECOLOGY

## 2017-07-21 PROCEDURE — 25000128 H RX IP 250 OP 636: Performed by: ANESTHESIOLOGY

## 2017-07-21 PROCEDURE — 85027 COMPLETE CBC AUTOMATED: CPT | Performed by: OBSTETRICS & GYNECOLOGY

## 2017-07-21 PROCEDURE — 25800025 ZZH RX 258: Performed by: OBSTETRICS & GYNECOLOGY

## 2017-07-21 PROCEDURE — 71000012 ZZH RECOVERY PHASE 1 LEVEL 1 FIRST HR: Performed by: OBSTETRICS & GYNECOLOGY

## 2017-07-21 PROCEDURE — 71000013 ZZH RECOVERY PHASE 1 LEVEL 1 EA ADDTL HR: Performed by: OBSTETRICS & GYNECOLOGY

## 2017-07-21 PROCEDURE — 37000008 ZZH ANESTHESIA TECHNICAL FEE, 1ST 30 MIN: Performed by: OBSTETRICS & GYNECOLOGY

## 2017-07-21 PROCEDURE — 36000085 ZZH SURGERY LEVEL 8 1ST 30 MIN: Performed by: OBSTETRICS & GYNECOLOGY

## 2017-07-21 PROCEDURE — 27210995 ZZH RX 272: Performed by: OBSTETRICS & GYNECOLOGY

## 2017-07-21 PROCEDURE — 86901 BLOOD TYPING SEROLOGIC RH(D): CPT | Performed by: OBSTETRICS & GYNECOLOGY

## 2017-07-21 PROCEDURE — 88305 TISSUE EXAM BY PATHOLOGIST: CPT | Mod: 26 | Performed by: OBSTETRICS & GYNECOLOGY

## 2017-07-21 PROCEDURE — 25000566 ZZH SEVOFLURANE, EA 15 MIN: Performed by: OBSTETRICS & GYNECOLOGY

## 2017-07-21 PROCEDURE — C1781 MESH (IMPLANTABLE): HCPCS | Performed by: OBSTETRICS & GYNECOLOGY

## 2017-07-21 PROCEDURE — 25000128 H RX IP 250 OP 636: Performed by: UROLOGY

## 2017-07-21 PROCEDURE — 25000132 ZZH RX MED GY IP 250 OP 250 PS 637: Performed by: OBSTETRICS & GYNECOLOGY

## 2017-07-21 PROCEDURE — 25800025 ZZH RX 258: Performed by: UROLOGY

## 2017-07-21 PROCEDURE — 25000125 ZZHC RX 250: Performed by: OBSTETRICS & GYNECOLOGY

## 2017-07-21 PROCEDURE — 37000009 ZZH ANESTHESIA TECHNICAL FEE, EACH ADDTL 15 MIN: Performed by: OBSTETRICS & GYNECOLOGY

## 2017-07-21 PROCEDURE — S0077 INJECTION, CLINDAMYCIN PHOSP: HCPCS | Performed by: OBSTETRICS & GYNECOLOGY

## 2017-07-21 PROCEDURE — 40000169 ZZH STATISTIC PRE-PROCEDURE ASSESSMENT I: Performed by: OBSTETRICS & GYNECOLOGY

## 2017-07-21 PROCEDURE — 25000125 ZZHC RX 250: Performed by: NURSE ANESTHETIST, CERTIFIED REGISTERED

## 2017-07-21 PROCEDURE — 86850 RBC ANTIBODY SCREEN: CPT | Performed by: OBSTETRICS & GYNECOLOGY

## 2017-07-21 PROCEDURE — C1771 REP DEV, URINARY, W/SLING: HCPCS | Performed by: OBSTETRICS & GYNECOLOGY

## 2017-07-21 PROCEDURE — 86900 BLOOD TYPING SEROLOGIC ABO: CPT | Performed by: OBSTETRICS & GYNECOLOGY

## 2017-07-21 PROCEDURE — 40000936 ZZH STATISTIC OUTPATIENT (NON-OBS) NIGHT

## 2017-07-21 PROCEDURE — 25000128 H RX IP 250 OP 636: Performed by: OBSTETRICS & GYNECOLOGY

## 2017-07-21 PROCEDURE — 88307 TISSUE EXAM BY PATHOLOGIST: CPT | Performed by: OBSTETRICS & GYNECOLOGY

## 2017-07-21 DEVICE — MESH SLING Y SHAPE RESTORELLE 24X4CM 501420: Type: IMPLANTABLE DEVICE | Site: PELVIS | Status: FUNCTIONAL

## 2017-07-21 DEVICE — DEVICE TVT OBTURATOR LASER 810081L: Type: IMPLANTABLE DEVICE | Site: BLADDER | Status: FUNCTIONAL

## 2017-07-21 RX ORDER — CIPROFLOXACIN 2 MG/ML
400 INJECTION, SOLUTION INTRAVENOUS EVERY 12 HOURS
Status: DISCONTINUED | OUTPATIENT
Start: 2017-07-22 | End: 2017-07-22 | Stop reason: HOSPADM

## 2017-07-21 RX ORDER — PROPOFOL 10 MG/ML
INJECTION, EMULSION INTRAVENOUS PRN
Status: DISCONTINUED | OUTPATIENT
Start: 2017-07-21 | End: 2017-07-21

## 2017-07-21 RX ORDER — ESTRADIOL 0.1 MG/G
CREAM VAGINAL
Qty: 2 G | Refills: 0 | Status: SHIPPED | OUTPATIENT
Start: 2017-07-21 | End: 2019-01-16

## 2017-07-21 RX ORDER — FENTANYL CITRATE 50 UG/ML
INJECTION, SOLUTION INTRAMUSCULAR; INTRAVENOUS PRN
Status: DISCONTINUED | OUTPATIENT
Start: 2017-07-21 | End: 2017-07-21

## 2017-07-21 RX ORDER — SODIUM CHLORIDE, SODIUM LACTATE, POTASSIUM CHLORIDE, CALCIUM CHLORIDE 600; 310; 30; 20 MG/100ML; MG/100ML; MG/100ML; MG/100ML
INJECTION, SOLUTION INTRAVENOUS CONTINUOUS
Status: DISCONTINUED | OUTPATIENT
Start: 2017-07-21 | End: 2017-07-21 | Stop reason: HOSPADM

## 2017-07-21 RX ORDER — DEXAMETHASONE SODIUM PHOSPHATE 4 MG/ML
INJECTION, SOLUTION INTRA-ARTICULAR; INTRALESIONAL; INTRAMUSCULAR; INTRAVENOUS; SOFT TISSUE PRN
Status: DISCONTINUED | OUTPATIENT
Start: 2017-07-21 | End: 2017-07-21

## 2017-07-21 RX ORDER — HYDROCODONE BITARTRATE AND ACETAMINOPHEN 5; 325 MG/1; MG/1
1 TABLET ORAL EVERY 6 HOURS PRN
Status: DISCONTINUED | OUTPATIENT
Start: 2017-07-21 | End: 2017-07-21

## 2017-07-21 RX ORDER — HYDROCODONE BITARTRATE AND ACETAMINOPHEN 5; 325 MG/1; MG/1
1 TABLET ORAL EVERY 4 HOURS PRN
Status: DISCONTINUED | OUTPATIENT
Start: 2017-07-21 | End: 2017-07-22 | Stop reason: HOSPADM

## 2017-07-21 RX ORDER — BUPIVACAINE HYDROCHLORIDE AND EPINEPHRINE 5; 5 MG/ML; UG/ML
INJECTION, SOLUTION PERINEURAL PRN
Status: DISCONTINUED | OUTPATIENT
Start: 2017-07-21 | End: 2017-07-21 | Stop reason: HOSPADM

## 2017-07-21 RX ORDER — GENTAMICIN SULFATE 60 MG/50ML
2 INJECTION, SOLUTION INTRAVENOUS
Status: COMPLETED | OUTPATIENT
Start: 2017-07-21 | End: 2017-07-21

## 2017-07-21 RX ORDER — BUPIVACAINE HYDROCHLORIDE AND EPINEPHRINE 2.5; 5 MG/ML; UG/ML
INJECTION, SOLUTION INFILTRATION; PERINEURAL PRN
Status: DISCONTINUED | OUTPATIENT
Start: 2017-07-21 | End: 2017-07-21 | Stop reason: HOSPADM

## 2017-07-21 RX ORDER — ONDANSETRON 2 MG/ML
4 INJECTION INTRAMUSCULAR; INTRAVENOUS EVERY 30 MIN PRN
Status: DISCONTINUED | OUTPATIENT
Start: 2017-07-21 | End: 2017-07-21 | Stop reason: HOSPADM

## 2017-07-21 RX ORDER — PROPOFOL 10 MG/ML
INJECTION, EMULSION INTRAVENOUS CONTINUOUS PRN
Status: DISCONTINUED | OUTPATIENT
Start: 2017-07-21 | End: 2017-07-21

## 2017-07-21 RX ORDER — ACETAMINOPHEN 325 MG/1
975 TABLET ORAL 4 TIMES DAILY PRN
Status: DISCONTINUED | OUTPATIENT
Start: 2017-07-21 | End: 2017-07-22 | Stop reason: HOSPADM

## 2017-07-21 RX ORDER — GLYCOPYRROLATE 0.2 MG/ML
INJECTION, SOLUTION INTRAMUSCULAR; INTRAVENOUS PRN
Status: DISCONTINUED | OUTPATIENT
Start: 2017-07-21 | End: 2017-07-21

## 2017-07-21 RX ORDER — SODIUM CHLORIDE 9 MG/ML
INJECTION, SOLUTION INTRAVENOUS CONTINUOUS
Status: DISCONTINUED | OUTPATIENT
Start: 2017-07-21 | End: 2017-07-22 | Stop reason: HOSPADM

## 2017-07-21 RX ORDER — FLUTICASONE PROPIONATE 50 MCG
1 SPRAY, SUSPENSION (ML) NASAL DAILY PRN
Status: DISCONTINUED | OUTPATIENT
Start: 2017-07-21 | End: 2017-07-22 | Stop reason: HOSPADM

## 2017-07-21 RX ORDER — ONDANSETRON 4 MG/1
4 TABLET, ORALLY DISINTEGRATING ORAL EVERY 30 MIN PRN
Status: DISCONTINUED | OUTPATIENT
Start: 2017-07-21 | End: 2017-07-21 | Stop reason: HOSPADM

## 2017-07-21 RX ORDER — NALOXONE HYDROCHLORIDE 0.4 MG/ML
.1-.4 INJECTION, SOLUTION INTRAMUSCULAR; INTRAVENOUS; SUBCUTANEOUS
Status: DISCONTINUED | OUTPATIENT
Start: 2017-07-21 | End: 2017-07-22 | Stop reason: HOSPADM

## 2017-07-21 RX ORDER — ASPIRIN 81 MG
100 TABLET, DELAYED RELEASE (ENTERIC COATED) ORAL DAILY
Qty: 60 TABLET | Refills: 0 | Status: SHIPPED | OUTPATIENT
Start: 2017-07-21 | End: 2017-09-19

## 2017-07-21 RX ORDER — EPHEDRINE SULFATE 50 MG/ML
INJECTION, SOLUTION INTRAMUSCULAR; INTRAVENOUS; SUBCUTANEOUS PRN
Status: DISCONTINUED | OUTPATIENT
Start: 2017-07-21 | End: 2017-07-21

## 2017-07-21 RX ORDER — VECURONIUM BROMIDE 1 MG/ML
INJECTION, POWDER, LYOPHILIZED, FOR SOLUTION INTRAVENOUS PRN
Status: DISCONTINUED | OUTPATIENT
Start: 2017-07-21 | End: 2017-07-21

## 2017-07-21 RX ORDER — CLINDAMYCIN PHOSPHATE 900 MG/50ML
900 INJECTION, SOLUTION INTRAVENOUS SEE ADMIN INSTRUCTIONS
Status: DISCONTINUED | OUTPATIENT
Start: 2017-07-21 | End: 2017-07-21 | Stop reason: HOSPADM

## 2017-07-21 RX ORDER — FENTANYL CITRATE 50 UG/ML
25-50 INJECTION, SOLUTION INTRAMUSCULAR; INTRAVENOUS
Status: DISCONTINUED | OUTPATIENT
Start: 2017-07-21 | End: 2017-07-21 | Stop reason: HOSPADM

## 2017-07-21 RX ORDER — LIDOCAINE HYDROCHLORIDE 20 MG/ML
INJECTION, SOLUTION INFILTRATION; PERINEURAL PRN
Status: DISCONTINUED | OUTPATIENT
Start: 2017-07-21 | End: 2017-07-21

## 2017-07-21 RX ORDER — ONDANSETRON 4 MG/1
4 TABLET, ORALLY DISINTEGRATING ORAL EVERY 6 HOURS PRN
Status: DISCONTINUED | OUTPATIENT
Start: 2017-07-21 | End: 2017-07-22 | Stop reason: HOSPADM

## 2017-07-21 RX ORDER — KETOROLAC TROMETHAMINE 15 MG/ML
15 INJECTION, SOLUTION INTRAMUSCULAR; INTRAVENOUS EVERY 6 HOURS
Status: DISCONTINUED | OUTPATIENT
Start: 2017-07-22 | End: 2017-07-22 | Stop reason: HOSPADM

## 2017-07-21 RX ORDER — MAGNESIUM HYDROXIDE 1200 MG/15ML
LIQUID ORAL PRN
Status: DISCONTINUED | OUTPATIENT
Start: 2017-07-21 | End: 2017-07-21 | Stop reason: HOSPADM

## 2017-07-21 RX ORDER — GENTAMICIN SULFATE 100 MG/100ML
1.7 INJECTION, SOLUTION INTRAVENOUS SEE ADMIN INSTRUCTIONS
Status: DISCONTINUED | OUTPATIENT
Start: 2017-07-21 | End: 2017-07-21 | Stop reason: HOSPADM

## 2017-07-21 RX ORDER — CIPROFLOXACIN 2 MG/ML
400 INJECTION, SOLUTION INTRAVENOUS EVERY 12 HOURS
Status: DISCONTINUED | OUTPATIENT
Start: 2017-07-21 | End: 2017-07-21

## 2017-07-21 RX ORDER — PHENAZOPYRIDINE HYDROCHLORIDE 200 MG/1
200 TABLET, FILM COATED ORAL ONCE
Status: COMPLETED | OUTPATIENT
Start: 2017-07-21 | End: 2017-07-21

## 2017-07-21 RX ORDER — HYDROCODONE BITARTRATE AND ACETAMINOPHEN 5; 325 MG/1; MG/1
1-2 TABLET ORAL EVERY 4 HOURS PRN
Qty: 25 TABLET | Refills: 0 | Status: SHIPPED | OUTPATIENT
Start: 2017-07-21 | End: 2017-09-20

## 2017-07-21 RX ORDER — HYDROMORPHONE HYDROCHLORIDE 1 MG/ML
.3-.5 INJECTION, SOLUTION INTRAMUSCULAR; INTRAVENOUS; SUBCUTANEOUS EVERY 5 MIN PRN
Status: DISCONTINUED | OUTPATIENT
Start: 2017-07-21 | End: 2017-07-21 | Stop reason: HOSPADM

## 2017-07-21 RX ORDER — ONDANSETRON 2 MG/ML
INJECTION INTRAMUSCULAR; INTRAVENOUS PRN
Status: DISCONTINUED | OUTPATIENT
Start: 2017-07-21 | End: 2017-07-21

## 2017-07-21 RX ORDER — ONDANSETRON 2 MG/ML
4 INJECTION INTRAMUSCULAR; INTRAVENOUS EVERY 6 HOURS PRN
Status: DISCONTINUED | OUTPATIENT
Start: 2017-07-21 | End: 2017-07-22 | Stop reason: HOSPADM

## 2017-07-21 RX ORDER — FLUTICASONE PROPIONATE 50 MCG
1 SPRAY, SUSPENSION (ML) NASAL DAILY PRN
COMMUNITY
End: 2019-01-16

## 2017-07-21 RX ORDER — CIPROFLOXACIN 2 MG/ML
INJECTION, SOLUTION INTRAVENOUS PRN
Status: DISCONTINUED | OUTPATIENT
Start: 2017-07-21 | End: 2017-07-21

## 2017-07-21 RX ORDER — CLINDAMYCIN PHOSPHATE 900 MG/50ML
900 INJECTION, SOLUTION INTRAVENOUS
Status: DISCONTINUED | OUTPATIENT
Start: 2017-07-21 | End: 2017-07-21 | Stop reason: HOSPADM

## 2017-07-21 RX ORDER — NEOSTIGMINE METHYLSULFATE 1 MG/ML
VIAL (ML) INJECTION PRN
Status: DISCONTINUED | OUTPATIENT
Start: 2017-07-21 | End: 2017-07-21

## 2017-07-21 RX ORDER — CLINDAMYCIN PHOSPHATE 900 MG/50ML
900 INJECTION, SOLUTION INTRAVENOUS
Status: COMPLETED | OUTPATIENT
Start: 2017-07-21 | End: 2017-07-21

## 2017-07-21 RX ORDER — ACETAMINOPHEN 10 MG/ML
1000 INJECTION, SOLUTION INTRAVENOUS ONCE
Status: COMPLETED | OUTPATIENT
Start: 2017-07-21 | End: 2017-07-21

## 2017-07-21 RX ORDER — HYDROMORPHONE HYDROCHLORIDE 1 MG/ML
.3-.5 INJECTION, SOLUTION INTRAMUSCULAR; INTRAVENOUS; SUBCUTANEOUS
Status: DISCONTINUED | OUTPATIENT
Start: 2017-07-21 | End: 2017-07-22 | Stop reason: HOSPADM

## 2017-07-21 RX ORDER — CIPROFLOXACIN 500 MG/1
500 TABLET, FILM COATED ORAL 2 TIMES DAILY
Qty: 6 TABLET | Refills: 0 | Status: SHIPPED | OUTPATIENT
Start: 2017-07-21 | End: 2017-07-24

## 2017-07-21 RX ORDER — ACETAMINOPHEN 10 MG/ML
1000 INJECTION, SOLUTION INTRAVENOUS 4 TIMES DAILY PRN
Status: DISCONTINUED | OUTPATIENT
Start: 2017-07-21 | End: 2017-07-21

## 2017-07-21 RX ADMIN — ROCURONIUM BROMIDE 40 MG: 10 INJECTION INTRAVENOUS at 13:16

## 2017-07-21 RX ADMIN — PROPOFOL 100 MCG/KG/MIN: 10 INJECTION, EMULSION INTRAVENOUS at 13:16

## 2017-07-21 RX ADMIN — PHENYLEPHRINE HYDROCHLORIDE 100 MCG: 10 INJECTION, SOLUTION INTRAMUSCULAR; INTRAVENOUS; SUBCUTANEOUS at 14:35

## 2017-07-21 RX ADMIN — GLYCOPYRROLATE 0.4 MG: 0.2 INJECTION, SOLUTION INTRAMUSCULAR; INTRAVENOUS at 15:57

## 2017-07-21 RX ADMIN — PHENYLEPHRINE HYDROCHLORIDE 150 MCG: 10 INJECTION, SOLUTION INTRAMUSCULAR; INTRAVENOUS; SUBCUTANEOUS at 14:14

## 2017-07-21 RX ADMIN — ROCURONIUM BROMIDE 10 MG: 10 INJECTION INTRAVENOUS at 13:33

## 2017-07-21 RX ADMIN — PHENYLEPHRINE HYDROCHLORIDE 100 MCG: 10 INJECTION, SOLUTION INTRAMUSCULAR; INTRAVENOUS; SUBCUTANEOUS at 13:43

## 2017-07-21 RX ADMIN — CIPROFLOXACIN 400 MG: 2 INJECTION INTRAVENOUS at 16:27

## 2017-07-21 RX ADMIN — PHENYLEPHRINE HYDROCHLORIDE 100 MCG: 10 INJECTION, SOLUTION INTRAMUSCULAR; INTRAVENOUS; SUBCUTANEOUS at 14:46

## 2017-07-21 RX ADMIN — FENTANYL CITRATE 100 MCG: 50 INJECTION, SOLUTION INTRAMUSCULAR; INTRAVENOUS at 13:16

## 2017-07-21 RX ADMIN — PHENYLEPHRINE HYDROCHLORIDE 150 MCG: 10 INJECTION, SOLUTION INTRAMUSCULAR; INTRAVENOUS; SUBCUTANEOUS at 13:50

## 2017-07-21 RX ADMIN — GENTAMICIN SULFATE 120 MG: 60 INJECTION, SOLUTION INTRAVENOUS at 13:33

## 2017-07-21 RX ADMIN — SODIUM CHLORIDE, POTASSIUM CHLORIDE, SODIUM LACTATE AND CALCIUM CHLORIDE: 600; 310; 30; 20 INJECTION, SOLUTION INTRAVENOUS at 14:42

## 2017-07-21 RX ADMIN — FENTANYL CITRATE 50 MCG: 50 INJECTION, SOLUTION INTRAMUSCULAR; INTRAVENOUS at 15:00

## 2017-07-21 RX ADMIN — SODIUM CHLORIDE: 9 INJECTION, SOLUTION INTRAVENOUS at 18:59

## 2017-07-21 RX ADMIN — MIDAZOLAM HYDROCHLORIDE 2 MG: 1 INJECTION, SOLUTION INTRAMUSCULAR; INTRAVENOUS at 13:12

## 2017-07-21 RX ADMIN — PROPOFOL 100 MG: 10 INJECTION, EMULSION INTRAVENOUS at 13:16

## 2017-07-21 RX ADMIN — CLINDAMYCIN PHOSPHATE 900 MG: 18 INJECTION, SOLUTION INTRAVENOUS at 13:23

## 2017-07-21 RX ADMIN — Medication 5 MG: at 14:55

## 2017-07-21 RX ADMIN — SODIUM CHLORIDE, POTASSIUM CHLORIDE, SODIUM LACTATE AND CALCIUM CHLORIDE: 600; 310; 30; 20 INJECTION, SOLUTION INTRAVENOUS at 11:47

## 2017-07-21 RX ADMIN — PHENYLEPHRINE HYDROCHLORIDE 100 MCG: 10 INJECTION, SOLUTION INTRAMUSCULAR; INTRAVENOUS; SUBCUTANEOUS at 13:29

## 2017-07-21 RX ADMIN — VECURONIUM BROMIDE 2 MG: 1 INJECTION, POWDER, LYOPHILIZED, FOR SOLUTION INTRAVENOUS at 14:12

## 2017-07-21 RX ADMIN — HYDROMORPHONE HYDROCHLORIDE 0.5 MG: 1 INJECTION, SOLUTION INTRAMUSCULAR; INTRAVENOUS; SUBCUTANEOUS at 19:14

## 2017-07-21 RX ADMIN — LIDOCAINE HYDROCHLORIDE 80 MG: 20 INJECTION, SOLUTION INFILTRATION; PERINEURAL at 13:16

## 2017-07-21 RX ADMIN — FENTANYL CITRATE 50 MCG: 50 INJECTION, SOLUTION INTRAMUSCULAR; INTRAVENOUS at 13:55

## 2017-07-21 RX ADMIN — Medication 5 MG: at 14:57

## 2017-07-21 RX ADMIN — ACETAMINOPHEN 1000 MG: 10 INJECTION, SOLUTION INTRAVENOUS at 17:15

## 2017-07-21 RX ADMIN — HYDROMORPHONE HYDROCHLORIDE 0.5 MG: 1 INJECTION, SOLUTION INTRAMUSCULAR; INTRAVENOUS; SUBCUTANEOUS at 17:30

## 2017-07-21 RX ADMIN — NEOSTIGMINE METHYLSULFATE 3 MG: 1 INJECTION INTRAMUSCULAR; INTRAVENOUS; SUBCUTANEOUS at 15:57

## 2017-07-21 RX ADMIN — DEXAMETHASONE SODIUM PHOSPHATE 4 MG: 4 INJECTION, SOLUTION INTRA-ARTICULAR; INTRALESIONAL; INTRAMUSCULAR; INTRAVENOUS; SOFT TISSUE at 13:33

## 2017-07-21 RX ADMIN — FENTANYL CITRATE 50 MCG: 50 INJECTION, SOLUTION INTRAMUSCULAR; INTRAVENOUS at 16:01

## 2017-07-21 RX ADMIN — PHENYLEPHRINE HYDROCHLORIDE 150 MCG: 10 INJECTION, SOLUTION INTRAMUSCULAR; INTRAVENOUS; SUBCUTANEOUS at 14:27

## 2017-07-21 RX ADMIN — PHENAZOPYRIDINE HYDROCHLORIDE 200 MG: 200 TABLET, COATED ORAL at 11:47

## 2017-07-21 RX ADMIN — ONDANSETRON 4 MG: 2 INJECTION INTRAMUSCULAR; INTRAVENOUS at 15:52

## 2017-07-21 ASSESSMENT — LIFESTYLE VARIABLES: TOBACCO_USE: 0

## 2017-07-21 ASSESSMENT — ENCOUNTER SYMPTOMS: SEIZURES: 0

## 2017-07-21 NOTE — IP AVS SNAPSHOT
[facility]    [address] [place] [id number]    Phone:  [phone]                                       After Visit Summary   7/21/2017    Raven Mahmood    MRN: [id number]           After Visit Summary Signature Page     I have received my discharge instructions, and my questions have been answered. I have discussed any challenges I see with this plan with the nurse or doctor.    ..........................................................................................................................................  Patient/Patient Representative Signature      ..........................................................................................................................................  Patient Representative Print Name and Relationship to Patient    ..................................................               ................................................  Date                                            Time    ..........................................................................................................................................  Reviewed by Signature/Title    ...................................................              ..............................................  Date                                                            Time

## 2017-07-21 NOTE — ANESTHESIA PREPROCEDURE EVALUATION
Anesthesia Evaluation     . Pt has had prior anesthetic.     No history of anesthetic complications          ROS/MED HX    ENT/Pulmonary:      (-) tobacco use and sleep apnea   Neurologic:      (-) seizures and CVA   Cardiovascular:        (-) hypertension   METS/Exercise Tolerance:     Hematologic:         Musculoskeletal:         GI/Hepatic: Comment: IBS       (-) GERD and liver disease   Renal/Genitourinary:      (-) renal disease   Endo:      (-) Type II DM and thyroid disease   Psychiatric:         Infectious Disease:        (-) Recent Fever   Malignancy:         Other:                     Physical Exam  Normal systems: cardiovascular, pulmonary and dental    Airway   Mallampati: I  TM distance: >3 FB  Neck ROM: full    Dental     Cardiovascular       Pulmonary                     Anesthesia Plan      History & Physical Review  History and physical reviewed and following examination; no interval change.    ASA Status:  2 .    NPO Status:  > 8 hours    Plan for General and ETT with Propofol induction. Maintenance will be Balanced.    PONV prophylaxis:  Ondansetron (or other 5HT-3) and Dexamethasone or Solumedrol       Postoperative Care  Postoperative pain management:  IV analgesics.      Consents  Anesthetic plan, risks, benefits and alternatives discussed with:  Patient..                          .

## 2017-07-21 NOTE — PROCEDURES
OP NOTE    Preop Dx:  62 year old with symptomatic uterine prolapse and cystocele    Postop Dx:  Same  Cervical polyp    Procedures:  Robotic assisted supracervical hysterectomy with bilateral salpingo-oophorectomy  Removal of cervical polyp    Surgeons:  Ernesto Ceballos    Anesthesia:  General    EBL:  10 cc    Observations:  Cervical polyp.  Normal appearing uterus, tubes and ovaries    Complications:  None    Disposition:  Patient stable in OR post hysterectomy; Dr. Bailey to do her part    Specimens:  Cervical polyp  Uterus with portion of cervix; fallopian tubes and ovaries    Dictated #[id number]

## 2017-07-21 NOTE — PROGRESS NOTES
Admission medication history interview status for the 7/21/2017  admission is complete. See EPIC admission navigator for prior to admission medications     Medication history source reliability:Good    Medication history interview source(s):Patient    Medication history resources (including written lists, pill bottles, clinic record):None    Primary pharmacy[facility]    Additional medication history information not noted on PTA med list :None    Time spent in this activity:  45 minutes    Prior to Admission medications    Medication Sig Last Dose Taking? Auth Provider   fluticasone (FLONASE) 50 MCG/ACT spray Spray 1 spray into both nostrils daily as needed for rhinitis or allergies 7/19/2017 at prn Yes Reported, Patient   Betamethasone Dipropionate Aug (DIPROLENE AF EX) Externally apply topically 2 times daily as needed Past Week at prn Yes Reported, Patient

## 2017-07-21 NOTE — BRIEF OP NOTE
[facility] Brief Operative Note    Pre-operative diagnosis: GENITAL PROLAPSE AND stress incontinence    Post-operative diagnosis Sa   Procedure: Procedure(s):  DAVINCI XI ROBOTIC ASSISTED SUPRACERVICAL LAPAROSCOPIC HYSTERECTOMY, BILATERAL SALPINGO-OOPHORECTOMIES (DR. WINSTON) RONI XI ROBOTIC ASSISTED SACROCOLPOPEXY, LYSIS OF ADHESIONS, MIDURETHRAL SLING, CYSTOSCOPY (SANJIV) - Wound Class: I-Clean   - Wound Class: I-Clean   - Wound Class: II-Clean Contaminated   - Wound Class: I-Clean   Surgeon(s): Surgeon(s) and Role:  Panel 1:     * Payam Winston MD - Primary     * Shae Orozco MD - Assisting    Panel 2:     * Minnie Bailey MD - Primary     * Zulay Campa PA-C - Assisting   Estimated blood loss: 20 mL    Specimens:   ID Type Source Tests Collected by Time Destination   A : cervical polyp Tissue Other SURGICAL PATHOLOGY EXAM Payam Winston MD 7/21/2017  1:52 PM    B :  Tissue Uterus with Bilateral Ovaries and Fallopian Tubes SURGICAL PATHOLOGY EXAM Payam Winston MD 7/21/2017  3:49 PM       Findings: We ll suspended cervix

## 2017-07-21 NOTE — ANESTHESIA CARE TRANSFER NOTE
Patient: Raven Mahmood    Procedure(s):  DAVINCI XI ROBOTIC ASSISTED SUPRACERVICAL LAPAROSCOPIC HYSTERECTOMY, BILATERAL SALPINGO-OOPHORECTOMIES (DR. WINSTON) DAVINCI XI ROBOTIC ASSISTED SACROCOLPOPEXY, LYSIS OF ADHESIONS, MIDURETHRAL SLING, CYSTOSCOPY (SANJIV) - Wound Class: I-Clean   - Wound Class: I-Clean   - Wound Class: II-Clean Contaminated   - Wound Class: I-Clean    Diagnosis: GENITAL PROLAPSE AND CYSTOCELE   Diagnosis Additional Information: No value filed.    Anesthesia Type:   General, ETT     Note:  Airway :Face Mask  Patient transferred to:PACU  Comments:     TOF 4/4 with sustained tetany > 5secs. Spontaneous resp with tidal volume >300ml.. Followed commands et purposeful. Strong tongue thrust. Extubated with cuff down. Pt maintains resp. O2 sat > 97%. Simple face mask on with 10l O2.To PACU: VSS, placed on monitors with alarms on. Report given to RN.;       Vitals: (Last set prior to Anesthesia Care Transfer)    CRNA VITALS  7/21/2017 1604 - 7/21/2017 1645      7/21/2017             Pulse: 85    SpO2: 100 %    Resp Rate (observed): 9                Electronically Signed By: PHUONG Gomez CRNA  July 21, 2017  4:45 PM

## 2017-07-22 VITALS
RESPIRATION RATE: 18 BRPM | BODY MASS INDEX: 22.29 KG/M2 | SYSTOLIC BLOOD PRESSURE: 90 MMHG | HEIGHT: 67 IN | WEIGHT: 142 LBS | HEART RATE: 65 BPM | TEMPERATURE: 97.6 F | OXYGEN SATURATION: 100 % | DIASTOLIC BLOOD PRESSURE: 55 MMHG

## 2017-07-22 LAB
GLUCOSE BLDC GLUCOMTR-MCNC: 151 MG/DL (ref 70–99)
GLUCOSE BLDC GLUCOMTR-MCNC: 80 MG/DL (ref 70–99)

## 2017-07-22 PROCEDURE — 40000934 ZZH STATISTIC OUTPATIENT (NON-OBS) DAY

## 2017-07-22 PROCEDURE — 25000132 ZZH RX MED GY IP 250 OP 250 PS 637: Performed by: UROLOGY

## 2017-07-22 PROCEDURE — 82962 GLUCOSE BLOOD TEST: CPT | Mod: 91

## 2017-07-22 PROCEDURE — 25000128 H RX IP 250 OP 636: Performed by: UROLOGY

## 2017-07-22 RX ADMIN — KETOROLAC TROMETHAMINE 15 MG: 15 INJECTION, SOLUTION INTRAMUSCULAR; INTRAVENOUS at 12:14

## 2017-07-22 RX ADMIN — SODIUM CHLORIDE: 9 INJECTION, SOLUTION INTRAVENOUS at 04:22

## 2017-07-22 RX ADMIN — HYDROCODONE BITARTRATE AND ACETAMINOPHEN 1 TABLET: 5; 325 TABLET ORAL at 00:08

## 2017-07-22 RX ADMIN — KETOROLAC TROMETHAMINE 15 MG: 15 INJECTION, SOLUTION INTRAMUSCULAR; INTRAVENOUS at 05:59

## 2017-07-22 RX ADMIN — CIPROFLOXACIN 400 MG: 2 INJECTION, SOLUTION INTRAVENOUS at 04:23

## 2017-07-22 NOTE — ANESTHESIA POSTPROCEDURE EVALUATION
Patient: Raven Mahmood    Procedure(s):  DAVINCI XI ROBOTIC ASSISTED SUPRACERVICAL LAPAROSCOPIC HYSTERECTOMY, BILATERAL SALPINGO-OOPHORECTOMIES (DR. WINSTON) DAVINCI XI ROBOTIC ASSISTED SACROCOLPOPEXY, LYSIS OF ADHESIONS, MIDURETHRAL SLING, CYSTOSCOPY (SANJIV) - Wound Class: I-Clean   - Wound Class: I-Clean   - Wound Class: II-Clean Contaminated   - Wound Class: I-Clean    Diagnosis:GENITAL PROLAPSE AND CYSTOCELE   Diagnosis Additional Information: No value filed.    Anesthesia Type:  General, ETT    Note:  Anesthesia Post Evaluation    Patient location during evaluation: PACU  Patient participation: Able to fully participate in evaluation  Level of consciousness: awake  Pain management: adequate  Airway patency: patent  Cardiovascular status: acceptable  Respiratory status: acceptable  Hydration status: acceptable  PONV: none     Anesthetic complications: None          Last vitals:  Vitals:    07/21/17 1800 07/21/17 1810 07/21/17 1814   BP: 92/58 94/56    Pulse:      Resp: 14 12    Temp:      SpO2: 96%  97%         Electronically Signed By: Francisco Hopkins MD  July 21, 2017  9:02 PM

## 2017-07-22 NOTE — PLAN OF CARE
Problem: Goal Outcome Summary  Goal: Goal Outcome Summary  Outcome: Improving  Patient A&O. VSS, RA. IV infusing. Tolerating liquid diet. C/O pain at incision site 5/10. PRN Norco administered x1. She is on schedule Toradol . Shore catheter and Vaginal packing  removed 0600 am in the morning. Due to void. Encouraged to ambulate but she wanted to wait little bit to ambulate.  Continue to monitor.

## 2017-07-22 NOTE — PLAN OF CARE
Problem: Discharge Planning  Goal: Discharge Planning (Adult, OB, Behavioral, Peds)  Outcome: Adequate for Discharge Date Met:  07/22/17  Pt is adequate to be discharged per both surgeons, pt is beginning to take more regular diet, up ambulating, I/O is adequate.  Minimal pain. Pt will be discharged to home later this afternoon, instructions will be given to pt about medications, follow up care, post op care.

## 2017-07-22 NOTE — PROGRESS NOTES
Patient able to ambulate as they were prior to admission or with assist devices provided by therapies during their stay. Partially met  ~ Nurse to Notify Provider when Outpatient in a Bed discharge goals have been met and patient is ready for discharge.

## 2017-07-22 NOTE — PROGRESS NOTES
"[facility] Gynecology Post-Op / Progress Note         Assessment and Plan:    Assessment:   Post-operative day #1  DAVINCI KYLE ROBOTIC ASSISTED SUPRACERVICAL LAPAROSCOPIC HYSTERECTOMY, BILATERAL SALPINGO-OOPHORECTOMIES (DR. WINSTON) DAVINCI XI ROBOTIC ASSISTED SACROCOLPOPEXY, LYSIS OF ADHESIONS, MIDURETHRAL SLING, CYSTOSCOPY (SANJIV)     Doing well.  Incision sites without signs of infection.  No immediate surgical complications identified.  Pain well-controlled.  Shore catheter removed and has voided once.      Plan:   Ambulate  Advance activity as tolerated  Pain control measures  Advance diet as tolerated  Discharge per Urology           Interval History:   Doing well.  Continues to improve.  Pain is well-controlled.  No fevers.              Significant Problems:    None          Review of Systems:    The patient denies any chest pain, shortness of breath, excessive pain, fever, chills, purulent drainage from the wound, nausea or vomiting.          Medications:   All medications related to the patient's surgery have been reviewed          Physical Exam:     All vitals stable  Patient Vitals for the past 12 hrs:   BP Temp Temp src Pulse Heart Rate Resp SpO2 Height Weight   07/22/17 1000 (!) 82/48 - - 65 - 18 - - -   07/22/17 0740 (!) 86/47 97.6  F (36.4  C) Oral - 65 - 97 % 1.702 m (5' 7\") 64.4 kg (142 lb)   07/22/17 0414 97/54 - - 69 - - 97 % - -   07/21/17 2356 (!) 85/42 97.7  F (36.5  C) - - 66 - 96 % - -     Incision sites clean and dry with minimal or no drainage.  Surrounding skin with minimal erythema.          Data:     All laboratory data related to this surgery reviewed  Hemoglobin   Date Value Ref Range Status   07/21/2017 12.8 11.7 - 15.7 g/dL Final   06/26/2017 13.0 11.7 - 15.7 g/dL Final   11/10/2016 13.0 11.7 - 15.7 g/dL Final   05/30/2010 12.6 11.7 - 15.7 g/dL Final     No imaging studies have been ordered  Payam Winston MD       "

## 2017-07-22 NOTE — PROGRESS NOTES
UROLOGY    She's doing well and has voided after her noble was removed.     Abd soft, appropriately tender.     PLAN  Discharge home.

## 2017-07-24 NOTE — OP NOTE
Surgeon / Clinician: Payam Ceballos MD    Preoperative Diagnosis:  A 62-year-old with symptomatic uterine prolapse and cystocele    Postoperative Diagnosis:  A 62-year-old with symptomatic uterine prolapse and cystocele    Procedures:  Robotic assisted supracervical hysterectomy with bilateral salpingo-oophorectomy    Surgeon:  Payam Ceballos MD    CO-SURGEON:  Shae Orozco MD    Anesthesia:  General    Estimated Blood Loss:  10 mL    Findings:  Normal appearing tubes and ovaries    Complications:  None    Disposition:  Raven Mahmood was stable in the operating room post hysterectomy.  Dr. Bailey (Urology), to do her part next.  Please see her dictation for details.    Specimens:  Uterus, portion of cervix, fallopian tubes and ovaries    Indications:  The patient is a 62-year-old who has symptomatic uterine prolapse and cystocele.  She was instructed to undergo surgical management.  I discussed with her the plan of performing a robotic-assisted supracervical hysterectomy with bilateral salpingo-oophorectomy.  Dr. Bailey would follow and perform a robotic-assisted sacrocolpopexy and perhaps a bladder sling as well.  These procedures were coordinated.  The patient understands the risks, benefits, pros and cons to the combined procedure.  Her questions were answered and she desired to proceed.    Description of Procedure:  The patient was taken to the operating room where general anesthesia was found to be adequate.  The patient was then prepped and draped in the usual sterile fashion placed in the dorsal lithotomy position.  Attention was turned to the perineum.  A Shore catheter was placed without difficulty.  Clear yellow urine was seen filling the Shore bag.  Speculum was then introduced into the vagina and cervix visualized.  There was an approximately 1 cm cervical polyp noted.  This was removed without difficulty with a polyp forceps.  Thereafter, the anterior portion of the cervix was grasped with  tenaculum.  A large V-care uterine manipulator was then placed without difficulty.  The single tooth tenaculum and speculum were removed from the vagina.      Attention was then turned to the abdomen.  Using a scalpel an approximately 1 cm vertical supraumbilical skin incision was made.  Through this opening, the fascia was grasped on either side and opened with the scalpel.  Both sides were tagged with a single interrupted stitch of 0 Vicryl.  The Veress needle was placed through this opening.  Saline was seen to flow through the Veress needle.  C02 gas was hooked up to the Veress needle.  Opening pressure was 2 mm/Hg.  The abdomen was then insufflated with 15 mm/Hg.  The Veress needle was then removed and a plastic blunt-tipped 8 mm trocar was placed through this site in the abdomen and pelvis.  The camera was then placed through this port site which confirmed the proper location.  Two ports were then placed on the right side under direct visualization by the camera as well as 2 on the left.  The robot was then docked and the appropriate instruments placed through the port sites.        Attention was now turned to the .  Using the vessel sealer, the attachments of the uterus were progressively grasped, cauterized and transected.  These included the right infundibular pelvic ligament followed by the round ligament followed by the broad ligament including the ureteral artery then the cardinal ligaments.  The bladder flap was created to the midline with laparoscopic scissors and cautery.  Excellent hemostasis was noted.  The exact same steps were performed on the patient's left hand side.  I could feel the V-care cup around the cervix.  We wanted to leave as much of the cervix as possible for Dr. Bailey. Therefore,  I did amputate the cervix in its upper 1/3 with the laparoscopic scissors and cautery. Excellent hemostasis was noted.  The specimen was freed up.  The specimen was placed in the posterior  cul-de-sac and will be removed at the end of the case by Dr. Bailey.  At this point, our part of the procedure was finished.  Dr. Bailey took over at this point.  Please see her note for details.              Payam Ceballos MD    D:  07/21/2017 16:03 T:  07/24/2017 08:07  Document:  [id number]

## 2017-07-24 NOTE — OP NOTE
Surgeon / Clinician: Minnie Bailey MD    PREOPERATIVE DIAGNOSES:  Pelvic organ prolapse and stress incontinence.    POSTOPERATIVE DIAGNOSES:  Pelvic organ prolapse and stress incontinence.    Procedure:    1.  Robotically-assisted sacral colpopexy.    2.  Mid ureteral sling placement and cystoscopy.  3.  Laparoscopically-assisted lysis of adhesions.    SURGEON:  Dr. Minnie Bailey    ASSISTANT:  Jesus Meza  ESTIMATED BLOOD LOSS:  20 mL.    SPECIMENS:  Uterus, fallopian tubes bilaterally and uterus in conjunction with robotically-assisted supracervical hysterectomy and bilateral salpingo-oophorectomy by Dr. Ceballos.    INDICATION FOR THE PROCEDURE:  Patient is a 62-year-old female, who presents with asymptomatic organ prolapse, uterine prolapse and cystocele grade 3 and stress incontinence for elective repair.  She understands risks of the procedure including bleeding, infection, injury to the surrounding organs, de erika urge incontinence, mesh erosion, dyspareunia, small-bowel obstruction, retention.  She also received FDA warning regarding all vaginally placed mesh and she would like to proceed.    Description of procedure:  Patient was brought to the operating room, placed in supine position.  After excellent induction of general anesthesia, placement of OG tube, her perineal and abdominal areas were prepped and draped in the regular fashion.  Shore catheter was placed.  A midline incision was made above the umbilicus.  The peritoneum was insufflated to the pressure of 15.  I placed 8 mm robotic trocar.  Evaluation of the abdominal cavity did demonstrate some adhesions in both left and right gutters areas.  Additional three 8 mm robotic trocars were placed throughout the abdomen and additional 5 mm air port sealed in the right abdomen.  The robot was docked in after patient was turned to steep Trendelenburg position and the case was started by Dr. Ceballos' team who proceeded with supracervical  hysterectomy and bilateral salpingo oophorectomy.  The specimen was dissected from the cervix leaving small amount and narrow cervix behind.  The cervical canal was cauterized.  After that I proceeded with my part of the dissection.  The peritoneum was opened up on top of the sacral promontory.  Anterior longitudinal ligaments were identified.  A 2-0 Prolene suture was placed through the ligament x1.  The peritoneum was opened up all the way to the cervix and dissection was carried over anterior and posterior surfaces of the cervix and vagina.  I positioned polypropylene type 1 mesh from [employer] kit called Restorelle in the Y shape configuration over anterior and posterior surfaces of the cervix and vagina.  I sutured using interrupted 2-0 Ethibond sutures x3 over posterior cervix with additional interrupted 2-0 Vicryl x4 over posterior vagina.  Over the anterior cervix I also used interrupted Ethibond to suture the graft to the anterior cervix using 2-0 Ethibond x3 and I used additional 2-0 Vicryl interrupted to suture the graft over the anterior vaginal wall x4 and 5.  That provided very good support and adherence of the mesh to the cervix and anterior and posterior surfaces of the vagina.  I adjusted tension-free positioning of the graft and sutured the long portion of the Y mesh to the already pre-placed Prolene suture over the sacrum.  In addition I placed a second Prolene suture 2-0 tagging the long portion of the Y mesh to the anterior longitudinal ligaments on top of the sacral promontory.  Once the graft was in place I closed the peritoneum on top of that using 2-0 Vicryl in a running fashion and by doing that I completely retroperitonealized the graft.  The specimens, uterus, fallopian tube with attached ovaries were placed in the EndoCatch bag and delivered through the extended midline incision in the bag and sent for pathology.  The midline incision was closed using figure-of-eight 2-0 Vicryl sutures  x3.  All incisional sites were closed using Vicryl 2-0 subcutaneously and Monocryl and Dermabond to the skin.  An additional 30 mL of Marcaine was introduced for postoperative pain control.    I proceeded with a midureteral sling placement.  The urethral space was hydrodistended using Marcaine with epinephrine.  I dissected using Metzenbaums all the way to the pubic rami bilaterally.  I positioned polypropylene type 1 mesh from TVT kit by [employer] in the mid urethra, going through the obturator foramen using inside-out technique.  Once the graft was positioned I performed cystoscopy that confirmed no stitch, no mesh in the bladder, urethra.  I was able to visualize efflux of urine coming from both ureteral orifices nicely.  I adjusted tension-free positioning of the graft by placing 8 Hegar dilator between the fold in the urethra and the graft itself.  Anterior vaginal wall was closed using 2-0 Vicryl in a running fashion.  Dermabond was applied to the incisions over  areas and vaginal packing was applied.  Shore catheter was left in place.      The patient was transferred to the recovery room in stable condition.  The plan as of now would be to DC vag pack, Shore catheter on postoperative day 1 and send her home postoperative day 1 with a followup appointment in 1 month interval.      Specimen once again, uterus, fallopian tubes bilateral and bilateral ovaries.        Minnie Bailey MD    D:  07/21/2017 18:05 T:  07/24/2017 08:46  Document:  [id number]\

## 2017-07-25 LAB — COPATH REPORT: NORMAL

## 2017-09-20 ENCOUNTER — OFFICE VISIT (OUTPATIENT)
Dept: FAMILY MEDICINE | Facility: CLINIC | Age: 62
End: 2017-09-20
Payer: COMMERCIAL

## 2017-09-20 VITALS
BODY MASS INDEX: 20.61 KG/M2 | SYSTOLIC BLOOD PRESSURE: 97 MMHG | HEART RATE: 64 BPM | WEIGHT: 136 LBS | HEIGHT: 68 IN | DIASTOLIC BLOOD PRESSURE: 62 MMHG | RESPIRATION RATE: 12 BRPM | TEMPERATURE: 97.9 F

## 2017-09-20 DIAGNOSIS — Z12.31 VISIT FOR SCREENING MAMMOGRAM: ICD-10-CM

## 2017-09-20 DIAGNOSIS — Z11.3 SCREEN FOR STD (SEXUALLY TRANSMITTED DISEASE): ICD-10-CM

## 2017-09-20 DIAGNOSIS — Z00.00 ROUTINE GENERAL MEDICAL EXAMINATION AT A HEALTH CARE FACILITY: Primary | ICD-10-CM

## 2017-09-20 DIAGNOSIS — Z11.1 SCREENING FOR TUBERCULOSIS: ICD-10-CM

## 2017-09-20 DIAGNOSIS — N39.46 MIXED INCONTINENCE URGE AND STRESS: ICD-10-CM

## 2017-09-20 DIAGNOSIS — Z23 NEED FOR PROPHYLACTIC VACCINATION AND INOCULATION AGAINST INFLUENZA: ICD-10-CM

## 2017-09-20 DIAGNOSIS — Z13.220 LIPID SCREENING: ICD-10-CM

## 2017-09-20 DIAGNOSIS — M85.89 OSTEOPENIA OF MULTIPLE SITES: ICD-10-CM

## 2017-09-20 LAB
ALBUMIN SERPL-MCNC: 3.9 G/DL (ref 3.4–5)
ALP SERPL-CCNC: 58 U/L (ref 40–150)
ALT SERPL W P-5'-P-CCNC: 25 U/L (ref 0–50)
ANION GAP SERPL CALCULATED.3IONS-SCNC: 7 MMOL/L (ref 3–14)
AST SERPL W P-5'-P-CCNC: 28 U/L (ref 0–45)
BILIRUB SERPL-MCNC: 0.5 MG/DL (ref 0.2–1.3)
BUN SERPL-MCNC: 15 MG/DL (ref 7–30)
CALCIUM SERPL-MCNC: 9.3 MG/DL (ref 8.5–10.1)
CHLORIDE SERPL-SCNC: 105 MMOL/L (ref 94–109)
CHOLEST SERPL-MCNC: 217 MG/DL
CO2 SERPL-SCNC: 29 MMOL/L (ref 20–32)
CREAT SERPL-MCNC: 0.85 MG/DL (ref 0.52–1.04)
GFR SERPL CREATININE-BSD FRML MDRD: 68 ML/MIN/1.7M2
GLUCOSE SERPL-MCNC: 81 MG/DL (ref 70–99)
HDLC SERPL-MCNC: 91 MG/DL
HIV 1+2 AB+HIV1 P24 AG SERPL QL IA: NONREACTIVE
LDLC SERPL CALC-MCNC: 112 MG/DL
NONHDLC SERPL-MCNC: 126 MG/DL
POTASSIUM SERPL-SCNC: 4.6 MMOL/L (ref 3.4–5.3)
PROT SERPL-MCNC: 7.8 G/DL (ref 6.8–8.8)
SODIUM SERPL-SCNC: 141 MMOL/L (ref 133–144)
TRIGL SERPL-MCNC: 68 MG/DL

## 2017-09-20 PROCEDURE — 80053 COMPREHEN METABOLIC PANEL: CPT | Performed by: FAMILY MEDICINE

## 2017-09-20 PROCEDURE — 99396 PREV VISIT EST AGE 40-64: CPT | Mod: 25 | Performed by: FAMILY MEDICINE

## 2017-09-20 PROCEDURE — 87389 HIV-1 AG W/HIV-1&-2 AB AG IA: CPT | Performed by: FAMILY MEDICINE

## 2017-09-20 PROCEDURE — 36415 COLL VENOUS BLD VENIPUNCTURE: CPT | Performed by: FAMILY MEDICINE

## 2017-09-20 PROCEDURE — 80061 LIPID PANEL: CPT | Performed by: FAMILY MEDICINE

## 2017-09-20 PROCEDURE — 90471 IMMUNIZATION ADMIN: CPT | Performed by: FAMILY MEDICINE

## 2017-09-20 PROCEDURE — 86480 TB TEST CELL IMMUN MEASURE: CPT | Performed by: FAMILY MEDICINE

## 2017-09-20 PROCEDURE — 90686 IIV4 VACC NO PRSV 0.5 ML IM: CPT | Performed by: FAMILY MEDICINE

## 2017-09-20 NOTE — NURSING NOTE
"Chief Complaint   Patient presents with     Physical       Initial BP 97/62  Pulse 64  Temp 97.9  F (36.6  C) (Oral)  Resp 12  Ht 5' 8\" (1.727 m)  Wt 136 lb (61.7 kg)  LMP 09/01/2005  BMI 20.68 kg/m2 Estimated body mass index is 20.68 kg/(m^2) as calculated from the following:    Height as of this encounter: 5' 8\" (1.727 m).    Weight as of this encounter: 136 lb (61.7 kg).  Medication Reconciliation: complete     Breann Lord MA     "

## 2017-09-20 NOTE — MR AVS SNAPSHOT
After Visit Summary   9/20/2017    Raven Mahmood    MRN: [id number]           Patient Information     Date Of Birth          1955        Visit Information        Provider Department      9/20/2017 9:00 AM Roshni Will MD [facility]        Today's Diagnoses     Routine general medical examination at a health care facility    -  1    Visit for screening mammogram        Need for prophylactic vaccination and inoculation against influenza        Lipid screening        Screening for tuberculosis        Screen for STD (sexually transmitted disease)        Osteopenia of multiple sites        Mixed incontinence urge and stress          Care Instructions    Call [facility] appointment line [phone] to schedule mammogram with arianna.    Call [facility] at [phone] to schedule DEXA (bone density) scan.      Preventive Health Recommendations    Female Ages 65 +    Yearly exam:     See your health care provider every year in order to  o Review health changes.   o Discuss preventive care.    o Review your medicines if your doctor has prescribed any.      You no longer need a yearly Pap test unless you've had an abnormal Pap test in the past 10 years. If you have vaginal symptoms, such as bleeding or discharge, be sure to talk with your provider about a Pap test.      Every 1 to 2 years, have a mammogram.  If you are over 69, talk with your health care provider about whether or not you want to continue having screening mammograms.      Every 10 years, have a colonoscopy. Or, have a yearly FIT test (stool test). These exams will check for colon cancer.       Have a cholesterol test every 5 years, or more often if your doctor advises it.       Have a diabetes test (fasting glucose) every three years. If you are at risk for diabetes, you should have this test more often.       At age 65, have a bone density scan (DEXA) to check for osteoporosis (brittle bone  disease).    Shots:    Get a flu shot each year.    Get a tetanus shot every 10 years.    Talk to your doctor about your pneumonia vaccines. There are now two you should receive - Pneumovax (PPSV 23) and Prevnar (PCV 13).    Talk to your doctor about the shingles vaccine.    Talk to your doctor about the hepatitis B vaccine.    Nutrition:     Eat at least 5 servings of fruits and vegetables each day.      Eat whole-grain bread, whole-wheat pasta and brown rice instead of white grains and rice.      Talk to your provider about Calcium and Vitamin D.     Lifestyle    Exercise at least 150 minutes a week (30 minutes a day, 5 days a week). This will help you control your weight and prevent disease.      Limit alcohol to one drink per day.      No smoking.       Wear sunscreen to prevent skin cancer.       See your dentist twice a year for an exam and cleaning.      See your eye doctor every 1 to 2 years to screen for conditions such as glaucoma, macular degeneration and cataracts.          Follow-ups after your visit        Additional Services     [facility] PT, HAND, AND CHIROPRACTIC REFERRAL       **This order will print in the [facility] Scheduling Office**    Physical Therapy, Hand Therapy and Chiropractic Care are available through:    *[facility]  *[facility]  *[facility]    Call one number to schedule at any of the above locations: [phone].    Your provider has referred you to: Physical Therapy at [facility] or [facility]    Indication/Reason for Referral: Women's Health (Please Complete Special Programs SmartList)  Onset of Illness: years ago - has had prolapse and had hysterectomy and bladder suspension in July - now incontinence is worse  Therapy Orders: Evaluate and Treat  Special Programs: None and Women's Health: Pelvic Floor Weakness: Incontinence:   Post Surgical Conditions: Hysterectomy and Other: bladder suspension and  Biofeedback if indicated  Special Request:  None    Dejan Wylie      Additional Comments for the Therapist or Chiropractor: Nichole at [facility].      Please be aware that coverage of these services is subject to the terms and limitations of your health insurance plan.  Call member services at your health plan with any benefit or coverage questions.      Please bring the following to your appointment:    *Your personal calendar for scheduling future appointments  *Comfortable clothing                  Future tests that were ordered for you today     Open Future Orders        Priority Expected Expires Ordered    MA SCREENING DIGITAL BILAT - Future  (s+30) Routine  9/20/2018 9/20/2017            Who to contact     If you have questions or need follow up information about today's clinic visit or your schedule please contact [facility] directly at [phone].  Normal or non-critical lab and imaging results will be communicated to you by Xeroshart, letter or phone within 4 business days after the clinic has received the results. If you do not hear from us within 7 days, please contact the clinic through MyChart or phone. If you have a critical or abnormal lab result, we will notify you by phone as soon as possible.  Submit refill requests through MiniTime or call your pharmacy and they will forward the refill request to us. Please allow 3 business days for your refill to be completed.          Additional Information About Your Visit        MiniTime Information     MiniTime gives you secure access to your electronic health record. If you see a primary care provider, you can also send messages to your care team and make appointments. If you have questions, please call your primary care clinic.  If you do not have a primary care provider, please call [phone] and they will assist you.        Care EveryWhere ID     This is your Care EveryWhere ID. This could be used by other organizations to access your [facility]  "records  [id number]        Your Vitals Were     Pulse Temperature Respirations Height Last Period BMI (Body Mass Index)    64 97.9  F (36.6  C) (Oral) 12 5' 8\" (1.727 m) 09/01/2005 20.68 kg/m2       Blood Pressure from Last 3 Encounters:   09/20/17 97/62   07/22/17 90/55   06/26/17 104/64    Weight from Last 3 Encounters:   09/20/17 136 lb (61.7 kg)   07/22/17 142 lb (64.4 kg)   06/26/17 139 lb 4 oz (63.2 kg)              We Performed the Following     Comprehensive metabolic panel     FLU VAC, SPLIT VIRUS IM > 3 YO (QUADRIVALENT) [39074]     HIV Antigen Antibody Combo     [facility] PT, HAND, AND CHIROPRACTIC REFERRAL     Lipid panel reflex to direct LDL     M Tuberculosis by Quantiferon     Vaccine Administration, Initial [27375]        Primary Care Provider Office Phone # Fax #    Roshni Will [phone]       [address] [facility] [address]        Equal Access to Services     [facility]: Hadii aad ku hadasho Soomaali, waaxda luqadaha, qaybta kaalmada adeegyaalisson, marybeth cerrato . So [facility] [phone].    ATENCIÓN: Si habla español, tiene a jones disposición servicios gratuitos de asistencia lingüística. Galo al [phone].    We comply with applicable federal civil rights laws and [place] laws. We do not discriminate on the basis of race, color, national origin, age, disability sex, sexual orientation or gender identity.            Thank you!     Thank you for choosing [facility]  for your care. Our goal is always to provide you with excellent care. Hearing back from our patients is one way we can continue to improve our services. Please take a few minutes to complete the written survey that you may receive in the mail after your visit with us. Thank you!             Your Updated Medication List - Protect others around you: Learn how to safely use, store and throw away your medicines at www.disposemymeds.org.          This list is accurate as " of: 9/20/17  9:59 AM.  Always use your most recent med list.                   Brand Name Dispense Instructions for use Diagnosis    DIPROLENE AF EX      Externally apply topically 2 times daily as needed        estradiol 0.1 MG/GM cream    ESTRACE    2 g    Apply pea size on a finger to the vagina Q hs    Uterine prolapse       fluticasone 50 MCG/ACT spray    FLONASE     Spray 1 spray into both nostrils daily as needed for rhinitis or allergies        HYDROcodone-acetaminophen 5-325 MG per tablet    NORCO    25 tablet    Take 1-2 tablets by mouth every 4 hours as needed for moderate to severe pain    Uterine prolapse

## 2017-09-20 NOTE — NURSING NOTE
Per orders of Dr. Will, injection of Flu Shot  given by MEENA BRICENO Patient instructed to remain in clinic for 15 minutes afterwards, and to report any adverse reaction to me immediately.

## 2017-09-20 NOTE — PATIENT INSTRUCTIONS
Call [facility] appointment line [phone] to schedule mammogram with arianna.    Call [facility] at [phone] to schedule DEXA (bone density) scan.      Preventive Health Recommendations    Female Ages 65 +    Yearly exam:     See your health care provider every year in order to  o Review health changes.   o Discuss preventive care.    o Review your medicines if your doctor has prescribed any.      You no longer need a yearly Pap test unless you've had an abnormal Pap test in the past 10 years. If you have vaginal symptoms, such as bleeding or discharge, be sure to talk with your provider about a Pap test.      Every 1 to 2 years, have a mammogram.  If you are over 69, talk with your health care provider about whether or not you want to continue having screening mammograms.      Every 10 years, have a colonoscopy. Or, have a yearly FIT test (stool test). These exams will check for colon cancer.       Have a cholesterol test every 5 years, or more often if your doctor advises it.       Have a diabetes test (fasting glucose) every three years. If you are at risk for diabetes, you should have this test more often.       At age 65, have a bone density scan (DEXA) to check for osteoporosis (brittle bone disease).    Shots:    Get a flu shot each year.    Get a tetanus shot every 10 years.    Talk to your doctor about your pneumonia vaccines. There are now two you should receive - Pneumovax (PPSV 23) and Prevnar (PCV 13).    Talk to your doctor about the shingles vaccine.    Talk to your doctor about the hepatitis B vaccine.    Nutrition:     Eat at least 5 servings of fruits and vegetables each day.      Eat whole-grain bread, whole-wheat pasta and brown rice instead of white grains and rice.      Talk to your provider about Calcium and Vitamin D.     Lifestyle    Exercise at least 150 minutes a week (30 minutes a day, 5 days a week). This will help you control your weight and prevent  disease.      Limit alcohol to one drink per day.      No smoking.       Wear sunscreen to prevent skin cancer.       See your dentist twice a year for an exam and cleaning.      See your eye doctor every 1 to 2 years to screen for conditions such as glaucoma, macular degeneration and cataracts.

## 2017-09-20 NOTE — PROGRESS NOTES
SUBJECTIVE:   CC: Raven Mahmood is an 62 year old woman who presents for preventive health visit.     Physical   Annual:     Getting at least 3 servings of Calcium per day::  Yes    Bi-annual eye exam::  Yes    Dental care twice a year::  Yes    Sleep apnea or symptoms of sleep apnea::  None    Diet::  Regular (no restrictions)    Frequency of exercise::  2-3 days/week    Duration of exercise::  30-45 minutes    Taking medications regularly::  No    Barriers to taking medications::  None    Additional concerns today::  No      Applying for [place] [employer] service position and needs additional labs for that.      Today's PHQ-2 Score:   PHQ-2 ( 1999 Pf[employer]) 9/17/2017   Q1: Little interest or pleasure in doing things 0   Q2: Feeling down, depressed or hopeless 0   PHQ-2 Score 0   Q1: Little interest or pleasure in doing things Not at all   Q2: Feeling down, depressed or hopeless Not at all   PHQ-2 Score 0       Abuse: Current or Past(Physical, Sexual or Emotional)- No  Do you feel safe in your environment - Yes    Social History   Substance Use Topics     Smoking status: Never Smoker     Smokeless tobacco: Never Used     Alcohol use Yes      Comment: 2 drinks a week     The patient does not drink >3 drinks per day nor >7 drinks per week.    Reviewed orders with patient.  Reviewed health maintenance and updated orders accordingly - Yes          Pertinent mammograms are reviewed under the imaging tab.  History of abnormal Pap smear:   NO - age 30- 65 PAP every 3 years recommended  Last 3 Pap Results:   PAP (no units)   Date Value   10/19/2015 NIL   10/15/2012 NIL   04/08/2010 NIL       Reviewed and updated as needed this visit by clinical staff  Tobacco  Allergies  Meds  Med Hx  Surg Hx  Fam Hx  Soc Hx          Past Medical History:   Diagnosis Date     Dysplasia of cervix, unspecified     cervical dysplasia - in college     Female stress incontinence     mild stress incontinence     Hemorrhoids      Irritable  bowel syndrome      Mucous polyp of cervix     cervical polyp 12:00, seen by GYN     Osteopenia     DEXA     Other specified temporomandibular joint disorders      Pelvic floor relaxation     pessary     Tinnitus     Dr. Conti      Past Surgical History:   Procedure Laterality Date     COLONOSCOPY  07    WNL, sister with polyps     COLONOSCOPY  2013    Procedure: COLONOSCOPY;  COLONOSCOPY;  Surgeon: Roland Gee MD;  Location:  GI     DAVINCI HYSTERECTOMY SUPRACERVICAL N/A 2017    Procedure: DAVINCI XI HYSTERECTOMY SUPRACERVICAL;  DAVINCI XI ROBOTIC ASSISTED SUPRACERVICAL LAPAROSCOPIC HYSTERECTOMY, BILATERAL SALPINGO-OOPHORECTOMIES (DR. WINSTON) DAVINCI XI ROBOTIC ASSISTED SACROCOLPOPEXY, LYSIS OF ADHESIONS, MIDURETHRAL SLING, CYSTOSCOPY (SANJIV);  Surgeon: Payam Winston MD;  Location:  OR     DAVINCI LYSIS OF ADHESIONS N/A 2017    Procedure: DAVINCI LYSIS OF ADHESIONS;;  Surgeon: Payam Winston MD;  Location:  OR     DAVINCI SACROCOLPOPEXY, MIDURETHRAL SLING, CYSTOSCOPY  2017    Procedure: DAVINCI XI SACROCOLPOPEXY, MIDURETHRAL SLING, CYSTOSCOPY;;  Surgeon: Minnie Bailey MD;  Location:  OR     DAVINCI SALPINGO-OOPHORECTOMY INCLUDING BILATERAL N/A 2017    Procedure: DAVINCI XI SALPINGO-OOPHORECTOMY INCLUDING BILATERAL;;  Surgeon: Payam Winston MD;  Location:  OR     ESOPHAGOSCOPY, GASTROSCOPY, DUODENOSCOPY (EGD), COMBINED N/A 2016    Procedure: COMBINED ESOPHAGOSCOPY, GASTROSCOPY, DUODENOSCOPY (EGD), BIOPSY SINGLE OR MULTIPLE;  Surgeon: Sergio Nguyễn MD;  Location:  GI     STRESS ECHO (METRO)      negative, at [facility]     Obstetric History       T1      L1     SAB0   TAB0   Ectopic0   Multiple0   Live Births0       # Outcome Date GA Lbr Jarret/2nd Weight Sex Delivery Anes PTL Lv   1 Term               Obstetric Comments    x 1       ROS:  CONST: NEGATIVE for fevers/chills/sweats, unexplained  "weight loss/gain, and fatigue  EYES: NEGATIVE for change in vision  ENT: NEGATIVE for difficulty hearing/tinnitus, and problems with teeth/gums  BREAST: NEGATIVE for breast lump/discharge  CV: NEGATIVE for chest pain/discomfort, leg pain with exercise, and palpitations  RESP: NEGATIVE for cough/wheeze, and difficulty breathing  GI: NEGATIVE for abdominal pain, blood in bowel movement, and nausea/vomiting/diarrhea  : NEGATIVE for nighttime urination, unusual vaginal bleeding, penile/vaginal discharge, and concerns about sexual function and POSITIVE for worsening incontinence since her hysterectomy/sacral colpopexy/ureteral sling surgery in July.    MS: NEGATIVE for muscle/joint pain  SKIN: NEGATIVE for rash or mole change  NEURO: NEGATIVE for headache, dizziness/lightheadedness, numbness, memory loss, and loss of coordination  PSYCH: NEGATIVE for anxiety/stress, problems with sleep, and depression  HEME: NEGATIVE for unexplained lumps, and easy bruising/bleeding  ENDO: NEGATIVE for excessive thirst or urination  ALL: NEGATIVE for hay fever/allergies      OBJECTIVE:   BP 97/62  Pulse 64  Temp 97.9  F (36.6  C) (Oral)  Resp 12  Ht 5' 8\" (1.727 m)  Wt 136 lb (61.7 kg)  LMP 09/01/2005  BMI 20.68 kg/m2  EXAM:  GENERAL APPEARANCE: healthy, alert and no distress  EYES: Eyes grossly normal to inspection, PERRL and conjunctivae and sclerae normal  HENT: ear canals and TM's normal, nose and mouth without ulcers or lesions, oropharynx clear and oral mucous membranes moist  NECK: no adenopathy, no asymmetry, masses, or scars and thyroid normal to palpation  RESP: lungs clear to auscultation - no rales, rhonchi or wheezes  BREAST: normal without masses, tenderness or nipple discharge and no palpable axillary masses or adenopathy  CV: regular rate and rhythm, normal S1 S2, no S3 or S4, no murmur, click or rub, no peripheral edema and peripheral pulses strong  ABDOMEN: soft, nontender, no hepatosplenomegaly, no masses and " bowel sounds normal  MS: no musculoskeletal defects are noted and gait is age appropriate without ataxia  SKIN: no suspicious lesions or rashes  NEURO: Normal strength and tone, sensory exam grossly normal, mentation intact and speech normal  PSYCH: mentation appears normal and affect normal/bright    ASSESSMENT/PLAN:   1. Routine general medical examination at a health care facility      2. Visit for screening mammogram  Prefers mammo with arianna  - MA SCREENING DIGITAL BILAT - Future  (s+30); Future    3. Need for prophylactic vaccination and inoculation against influenza  - FLU VAC, SPLIT VIRUS IM > 3 YO (QUADRIVALENT) [41649]  - Vaccine Administration, Initial [75813]    4. Lipid screening  - Lipid panel reflex to direct LDL    5. Screening for tuberculosis  - M Tuberculosis by [employer]    6. Screen for STD (sexually transmitted disease)  - HIV Antigen Antibody Combo    7. Osteopenia of multiple sites  Reminded her to schedule DEXA as previously ordered  - Comprehensive metabolic panel    8. Mixed incontinence urge and stress  Referred to [facility]  - [facility]FERR[facility]    COUNSELING:  Reviewed preventive health counseling, as reflected in patient instructions      Counseling Resources:  ATP IV Guidelines  Pooled Cohorts Equation Calculator  Breast Cancer Risk Calculator  FRAX Risk Assessment  ICSI Preventive Guidelines  Dietary Guidelines for Americans, 2010  [employer]'s MyPlate  ASA Prophylaxis  Lung CA Screening    Roshni Will MD      Injectable Influenza Immunization Documentation    1.  Is the person to be vaccinated sick today?   No    2. Does the person to be vaccinated have an allergy to a component   of the vaccine?   No    3. Has the person to be vaccinated ever had a serious reaction   to influenza vaccine in the past?   No    4. Has the person to be vaccinated ever had Guillain-Barré syndrome?   No    Form completed by   Breann Lord MA

## 2017-09-21 LAB
M TB TUBERC IFN-G BLD QL: NEGATIVE
M TB TUBERC IFN-G/MITOGEN IGNF BLD: 0 IU/ML

## 2017-09-22 ENCOUNTER — HOSPITAL ENCOUNTER (OUTPATIENT)
Dept: MAMMOGRAPHY | Facility: CLINIC | Age: 62
Discharge: HOME | End: 2017-09-22
Attending: FAMILY MEDICINE | Admitting: FAMILY MEDICINE
Payer: COMMERCIAL

## 2017-09-22 DIAGNOSIS — Z12.31 VISIT FOR SCREENING MAMMOGRAM: ICD-10-CM

## 2017-09-22 PROCEDURE — G0202 SCR MAMMO BI INCL CAD: HCPCS

## 2017-09-22 PROCEDURE — 77063 BREAST TOMOSYNTHESIS BI: CPT

## 2017-09-26 NOTE — PROGRESS NOTES
Alden Carbajal,  This all looks good.  Your lipids are improved from 2014 - good job!    I did email your forms with associated results to your [employer] account.  Let me know if you did not get it.  We'll mail you the originals.  Roshni Will MD

## 2017-09-27 ENCOUNTER — RADIANT APPOINTMENT (OUTPATIENT)
Dept: BONE DENSITY | Facility: CLINIC | Age: 62
End: 2017-09-27
Attending: FAMILY MEDICINE
Payer: COMMERCIAL

## 2017-09-27 DIAGNOSIS — Z78.0 ASYMPTOMATIC POSTMENOPAUSAL STATE: ICD-10-CM

## 2017-09-27 PROCEDURE — 77080 DXA BONE DENSITY AXIAL: CPT | Performed by: INTERNAL MEDICINE

## 2017-10-06 ENCOUNTER — THERAPY VISIT (OUTPATIENT)
Dept: PHYSICAL THERAPY | Facility: CLINIC | Age: 62
End: 2017-10-06
Payer: COMMERCIAL

## 2017-10-06 DIAGNOSIS — M99.05 PELVIC SOMATIC DYSFUNCTION: Primary | ICD-10-CM

## 2017-10-06 DIAGNOSIS — R32 URINARY INCONTINENCE: ICD-10-CM

## 2017-10-06 PROCEDURE — 97112 NEUROMUSCULAR REEDUCATION: CPT | Mod: GP | Performed by: PHYSICAL THERAPIST

## 2017-10-06 PROCEDURE — 97161 PT EVAL LOW COMPLEX 20 MIN: CPT | Mod: GP | Performed by: PHYSICAL THERAPIST

## 2017-10-06 PROCEDURE — 97110 THERAPEUTIC EXERCISES: CPT | Mod: GP | Performed by: PHYSICAL THERAPIST

## 2017-10-06 PROCEDURE — 97530 THERAPEUTIC ACTIVITIES: CPT | Mod: GP | Performed by: PHYSICAL THERAPIST

## 2017-10-06 NOTE — PROGRESS NOTES
Subjective:    Patient is a 62 year old female presenting with rehab left ankle/foot hpi.                                        Medical allergies: yes (penicillin).  Other surgeries include:  Other.        Primary job tasks include:  Prolonged sitting.                                Objective:    System    Physical Exam    General     ROS    Assessment/Plan:

## 2017-10-06 NOTE — PROGRESS NOTES
"Subjective:  SUBJECTIVE:  Patient reports onset of symptoms of urinary incontinence s/p partial hysterectomy and prolapse surgery on 7/21/2017.  Patient has had history of prolapse worsening over years so opted for surgery.  She reports since surgery, she has had difficulty with urinary incontinence.  Things are improving but is still feeling a \"heaviness\" at times in perineal area.  She would like to return to yoga and pilates.  She denies pain.  Since onset symptoms have been getting better, worse or staying the same? better    Urination:  Do you leak on the way to the bathroom or with a strong urge to void? Yes    Do you leak with cough,sneeze, jumping, running?Yes   Any other activities that cause leaking? Can be with walking at times spontaneous  Do you have triggers that make you feel you can't wait to go to the bathroom? No.  Type of pad and number used per day? 1 sanitary slim pad  When you leak what is the amount? small    How long can you delay the need to urinate? Depends, reports she can void 2-4 hours between voids.   How many times do you get up to urinate at night? 0  Can you stop the flow of urine when on the toilet? sometimes  Is the volume of urine passed usually: average. (8sec rule=  250ml with average bladder storing  400-600ml)    Do you strain to pass urine? No  Do you have a slow or hesitant urinary stream? No  Do you have difficulty initiating the urine stream? No    How many bladder infections have you had in last 12 months?none    Fluid intake(one glass is 8oz or one cup) 2glasses/day, 2caffinated glasses/day  Not answered alcohol glasses/day.    Bowel habits:  Frequency of bowel movements?1 times a day  Consistancy of stool? soft formed,  Do you ignore the urge to defecate? No  Do you strain to pass stool? No    Pelvic Pain:  When do you have pelvic pain? none  Is initial penetration during intercourse painful? No  Is deeper penetration painful? No  Do you use lubricant? no      Given " birth? Yes Any complications?no, # of vaginal delieveries?1,   Are you sexually active?Yes, however has not had intercourse since surgery  Have you ever been worried for your physical safety? No  Any abdominal or pelvic surgeries? Prolapse repair/partial hyterectomy  Are you having any regular exercise?yes, walking, would like to resume yoga and pilates  Have you practiced the PF(kegel) exercises for 4 or more weeks?no    HPI                    Objective:    System                                 Pelvic Dysfunction Evaluation:      Diagnostic Tests:        Post Void Residual:  Reports normal at MD office                    Abdominal Wall:    Diastasis Recti:  NA    Scar Mobility:  Min hypomobile scar in upper ab area  Pelvic Clock Exam:          Levator ANI:  -        External Assessment:    Skin Condition:  Normal    Bearing Down/Coughing:  Normal  Tissue Symmetry:  Normal    Muscle Contraction/Perineal Mobility:  Substitution and slight lift, no urogential triangle descent  Internal Assessment:  Internal assessment pelvic: P = 1/5, E = 4, F = 6 reps.    Contraction/Grade:  Fllicker muscles stretched (1)          SEMG Biofeedback:  Semg biofeedback pelvic: w/r difference average 3.19 uV.  Equipment:  MR20  Surface electrode placement--Abdominals: yes  Suraface electrode placement--Perianal:  Yes      Peak pelvic muscle contraction:  22 uV                             General     ROS    Assessment/Plan:      Patient is a 62 year old female with pelvic complaints.    Patient has the following significant findings with corresponding treatment plan.                Diagnosis 1:  Pelvic floor weakness s/p surgery   Decreased strength - therapeutic exercise, therapeutic activities and home program  Impaired muscle performance - biofeedback, neuro re-education and home program  Decreased function - therapeutic activities and home program    Therapy Evaluation Codes:   1) History comprised of:   Personal factors that impact  the plan of care:      Past/current experiences and Time since onset of symptoms.    Comorbidity factors that impact the plan of care are:      None.     Medications impacting care: None.  2) Examination of Body Systems comprised of:   Body structures and functions that impact the plan of care:      Pelvis.   Activity limitations that impact the plan of care are:      Urinary incontinence.  3) Clinical presentation characteristics are:   Stable/Uncomplicated.  4) Decision-Making    Low complexity using standardized patient assessment instrument and/or measureable assessment of functional outcome.  Cumulative Therapy Evaluation is: Low complexity.    Previous and current functional limitations:  (See Goal Flow Sheet for this information)    Short term and Long term goals: (See Goal Flow Sheet for this information)     Communication ability:  Patient appears to be able to clearly communicate and understand verbal and written communication and follow directions correctly.  Treatment Explanation - The following has been discussed with the patient:   RX ordered/plan of care  Anticipated outcomes  Possible risks and side effects  This patient would benefit from PT intervention to resume normal activities.   Rehab potential is good.    Frequency:  1 X week, once daily  Duration:  for 2 weeks tapering to 1 x  Month x 2 months  Discharge Plan:  Achieve all LTG.  Independent in home treatment program.  Reach maximal therapeutic benefit.    Please refer to the daily flowsheet for treatment today, total treatment time and time spent performing 1:1 timed codes.

## 2017-10-09 NOTE — PROGRESS NOTES
Hi Raven,  The result of your recent DEXA scan is abnormal.  The density of your bones is thinner than expected. This is called osteopenia when there is mild to moderate thinning and osteoporosis when there  is moderate to severe thinning. This may put you at risk for a fracture.    You have osteopenia.    Compared to your previous DEXA scan, these results are worse.    To help prevent further loss of bone, the following is recommended:    Take in adequate amounts of Calcium and Vitamin D. These are the building blocks for bones. Most women will need 1500mg of Calcium and 1000 international units of Vitamin D daily.  It is best to get your calcium through your diet; if you get 3 servings of dairy products daily you should not need calcium supplements.      Exercise daily to keep your bones strong. Thirty minutes of moderate walking or other aerobic activity is recommended.    If you are a smoker, make an appointment to discuss smoking cessation.    I do not recommend prescription bone-preserving medication at this time, but we should continue to monitor this with a repeat DEXA scan in about 3 years.    If you have any questions or concerns, please schedule an appointment with me to further discuss these results.    Roshni Will MD

## 2017-10-13 ENCOUNTER — THERAPY VISIT (OUTPATIENT)
Dept: PHYSICAL THERAPY | Facility: CLINIC | Age: 62
End: 2017-10-13
Payer: COMMERCIAL

## 2017-10-13 DIAGNOSIS — R32 URINARY INCONTINENCE: ICD-10-CM

## 2017-10-13 DIAGNOSIS — M99.05 PELVIC SOMATIC DYSFUNCTION: ICD-10-CM

## 2017-10-13 PROCEDURE — 97530 THERAPEUTIC ACTIVITIES: CPT | Mod: GP | Performed by: PHYSICAL THERAPIST

## 2017-10-13 PROCEDURE — 97110 THERAPEUTIC EXERCISES: CPT | Mod: GP | Performed by: PHYSICAL THERAPIST

## 2017-11-02 DIAGNOSIS — J31.0 CHRONIC RHINITIS, UNSPECIFIED TYPE: ICD-10-CM

## 2017-11-06 RX ORDER — FLUTICASONE PROPIONATE 50 MCG
SPRAY, SUSPENSION (ML) NASAL
Qty: 16 ML | Refills: 10 | Status: SHIPPED | OUTPATIENT
Start: 2017-11-06 | End: 2018-11-25

## 2018-04-27 ENCOUNTER — ALLIED HEALTH/NURSE VISIT (OUTPATIENT)
Dept: NURSING | Facility: CLINIC | Age: 63
End: 2018-04-27
Payer: COMMERCIAL

## 2018-04-27 DIAGNOSIS — Z23 ENCOUNTER FOR IMMUNIZATION: Primary | ICD-10-CM

## 2018-04-27 PROCEDURE — 90471 IMMUNIZATION ADMIN: CPT

## 2018-04-27 PROCEDURE — 99207 ZZC NO CHARGE NURSE ONLY: CPT

## 2018-04-27 PROCEDURE — 90750 HZV VACC RECOMBINANT IM: CPT

## 2018-04-27 NOTE — NURSING NOTE
Screening Questionnaire for Adult Immunization    Are you sick today?   No   Do you have allergies to medications, food, a vaccine component or latex?   Yes   Have you ever had a serious reaction after receiving a vaccination?   No   Do you have a long-term health problem with heart disease, lung disease, asthma, kidney disease, metabolic disease (e.g. diabetes), anemia, or other blood disorder?   No   Do you have cancer, leukemia, HIV/AIDS, or any other immune system problem?   No   In the past 3 months, have you taken medications that affect  your immune system, such as prednisone, other steroids, or anticancer drugs; drugs for the treatment of rheumatoid arthritis, Crohn s disease, or psoriasis; or have you had radiation treatments?   No   Have you had a seizure, or a brain or other nervous system problem?   No   During the past year, have you received a transfusion of blood or blood     products, or been given immune (gamma) globulin or antiviral drug?   No   For women: Are you pregnant or is there a chance you could become        pregnant during the next month?   No   Have you received any vaccinations in the past 4 weeks?   No     Immunization questionnaire was positive for at least one answer.  Notified Dr. Mo.        Per orders of Dr. Waterman, injection of Shingrix given by Jarad Dillard. Patient instructed to remain in clinic for 15 minutes afterwards, and to report any adverse reaction to me immediately.     Due to injection administration, patient instructed to remain in clinic for 15 minutes  afterwards, and to report any adverse reaction to me immediately.    Screening performed by Jarad Dillard on 4/27/2018 at 11:32 AM.  2

## 2018-04-27 NOTE — MR AVS SNAPSHOT
After Visit Summary   4/27/2018    Raven Mahmood    MRN: [id number]           Patient Information     Date Of Birth          1955        Visit Information        Provider Department      4/27/2018 11:00 AM HW MEDICAL ASSISTANT [facility] [place]        Today's Diagnoses     Encounter for immunization    -  1       Follow-ups after your visit        Who to contact     If you have questions or need follow up information about today's clinic visit or your schedule please contact [facility] directly at [phone].  Normal or non-critical lab and imaging results will be communicated to you by Moneylibhart, letter or phone within 4 business days after the clinic has received the results. If you do not hear from us within 7 days, please contact the clinic through [employer] or phone. If you have a critical or abnormal lab result, we will notify you by phone as soon as possible.  Submit refill requests through [facility] or call your pharmacy and they will forward the refill request to us. Please allow 3 business days for your refill to be completed.          Additional Information About Your Visit        MyChart Information     [facility] gives you secure access to your electronic health record. If you see a primary care provider, you can also send messages to your care team and make appointments. If you have questions, please call your primary care clinic.  If you do not have a primary care provider, please call [phone] and they will assist you.        Care EveryWhere ID     This is your Care EveryWhere ID. This could be used by other organizations to access your [facility] medical records  [id number]        Your Vitals Were     Last Period                   09/01/2005            Blood Pressure from Last 3 Encounters:   09/20/17 97/62   07/22/17 90/55   06/26/17 104/64    Weight from Last 3 Encounters:   09/20/17 136 lb (61.7 kg)   07/22/17 142 lb (64.4 kg)   06/26/17 139 lb 4 oz (63.2  kg)              We Performed the Following     ADMIN 1st VACCINE     ZOSTER VACCINE RECOMBINANT ADJUVANTED IM NJX (SHINGRIX)        Primary Care Provider Office Phone # Fax #    Roshni Will [phone] [address]  [facility] [address]        Equal Access to Services     [facility] GAVIN : Hadii aad ku hadasho Soomaali, waaxda luqadaha, qaybta kaalmada adeegyada, waxay orain hayaleyda amayamaribeltim cerrato . So [facility] [phone].    ATENCIÓN: Si habla español, tiene a jones disposición servicios gratuitos de asistencia lingüística. Galo al [phone].    We comply with applicable federal civil rights laws and [place] laws. We do not discriminate on the basis of race, color, national origin, age, disability, sex, sexual orientation, or gender identity.            Thank you!     Thank you for choosing [facility]  for your care. Our goal is always to provide you with excellent care. Hearing back from our patients is one way we can continue to improve our services. Please take a few minutes to complete the written survey that you may receive in the mail after your visit with us. Thank you!             Your Updated Medication List - Protect others around you: Learn how to safely use, store and throw away your medicines at www.disposemymeds.org.          This list is accurate as of 4/27/18 11:37 AM.  Always use your most recent med list.                   Brand Name Dispense Instructions for use Diagnosis    estradiol 0.1 MG/GM cream    ESTRACE    2 g    Apply pea size on a finger to the vagina Q hs    Uterine prolapse       * fluticasone 50 MCG/ACT spray    FLONASE     Spray 1 spray into both nostrils daily as needed for rhinitis or allergies        * fluticasone 50 MCG/ACT spray    FLONASE    16 mL    SHAKE LIQUID AND USE 1 TO 2 SPRAYS IN EACH NOSTRIL DAILY    Chronic rhinitis, unspecified type       * Notice:  This list has 2 medication(s) that are the same as other  medications prescribed for you. Read the directions carefully, and ask your doctor or other care provider to review them with you.

## 2018-11-25 DIAGNOSIS — J31.0 CHRONIC RHINITIS: ICD-10-CM

## 2018-11-26 NOTE — TELEPHONE ENCOUNTER
"Requested Prescriptions   Pending Prescriptions Disp Refills     fluticasone (FLONASE) 50 MCG/ACT spray [Pharmacy Med Name: FLUTICASONE 50MCG NASAL SP (120) RX]  Last Written Prescription Date:  11/6/17  Last Fill Quantity: 16 ML,  # refills: 10   Last office visit: 9/20/17 with prescribing provider:  SHAYY   Future Office Visit:     16 mL 0     Sig: SHAKE LIQUID AND USE 1 TO 2 SPRAYS IN EACH NOSTRIL DAILY    Inhaled Steroids Protocol Failed    11/25/2018 10:14 PM       Failed - Recent (12 mo) or future (30 days) visit within the authorizing provider's specialty    Patient had office visit in the last 12 months or has a visit in the next 30 days with authorizing provider or within the authorizing provider's specialty.  See \"Patient Info\" tab in inbasket, or \"Choose Columns\" in Meds & Orders section of the refill encounter.             Passed - Patient is age 12 or older          "

## 2018-11-27 RX ORDER — FLUTICASONE PROPIONATE 50 MCG
SPRAY, SUSPENSION (ML) NASAL
Qty: 16 ML | Refills: 0 | Status: SHIPPED | OUTPATIENT
Start: 2018-11-27 | End: 2018-12-26

## 2018-11-27 NOTE — TELEPHONE ENCOUNTER
Please call patient to schedule office visit.  Patient has not been seen in clinic for greater than 1 year  Jenn Rice RN

## 2018-12-26 DIAGNOSIS — J31.0 CHRONIC RHINITIS: ICD-10-CM

## 2018-12-26 NOTE — TELEPHONE ENCOUNTER
"Requested Prescriptions   Pending Prescriptions Disp Refills     fluticasone (FLONASE) 50 MCG/ACT nasal spray [Pharmacy Med Name: FLUTICASONE 50MCG NASAL SP (120) RX]  Last Written Prescription Date:  11/27/2018  Last Fill Quantity: 16ml,  # refills: 0   Last Office Visit: 9/20/2017   Future Office Visit:      16 mL 0     Sig: SHAKE LIQUID AND USE 1 TO 2 SPRAYS IN EACH NOSTRIL DAILY    Inhaled Steroids Protocol Failed - 12/26/2018 11:37 AM       Failed - Recent (12 mo) or future (30 days) visit within the authorizing provider's specialty    Patient had office visit in the last 12 months or has a visit in the next 30 days with authorizing provider or within the authorizing provider's specialty.  See \"Patient Info\" tab in inbasket, or \"Choose Columns\" in Meds & Orders section of the refill encounter.           Passed - Patient is age 12 or older          "

## 2018-12-28 RX ORDER — FLUTICASONE PROPIONATE 50 MCG
SPRAY, SUSPENSION (ML) NASAL
Qty: 16 ML | Refills: 0 | Status: SHIPPED | OUTPATIENT
Start: 2018-12-28 | End: 2019-01-16

## 2018-12-28 NOTE — TELEPHONE ENCOUNTER
Routing refill request to provider for review/approval because:  Justina given x1 and patient did not follow up, please advise      Routing to HW Reception - please advise patient due for annual office visit for future refills - medication is available as over the counter      Thank you,  Carolynn Lackey RN

## 2019-01-01 NOTE — TELEPHONE ENCOUNTER
--Please see patient's msg in this MyChart encounter.  --I assume you do not need to fill out this pre-op form from [facility]?    --For the EKG I see you responded in another encounter patient sent about this today.  --I will let her know she can schedule EKG with nurse only.      Ivette Arzola RN       
Advised completing EKG via mychart    Carolynn Lackey RN    
WDL

## 2019-01-16 ENCOUNTER — OFFICE VISIT (OUTPATIENT)
Dept: FAMILY MEDICINE | Facility: CLINIC | Age: 64
End: 2019-01-16
Payer: COMMERCIAL

## 2019-01-16 VITALS
BODY MASS INDEX: 21.13 KG/M2 | TEMPERATURE: 98.4 F | DIASTOLIC BLOOD PRESSURE: 64 MMHG | HEART RATE: 72 BPM | RESPIRATION RATE: 18 BRPM | WEIGHT: 139 LBS | OXYGEN SATURATION: 95 % | SYSTOLIC BLOOD PRESSURE: 128 MMHG

## 2019-01-16 DIAGNOSIS — R06.83 SNORING: ICD-10-CM

## 2019-01-16 DIAGNOSIS — Z00.00 ROUTINE GENERAL MEDICAL EXAMINATION AT A HEALTH CARE FACILITY: Primary | ICD-10-CM

## 2019-01-16 DIAGNOSIS — Z23 NEED FOR ZOSTER VACCINATION: ICD-10-CM

## 2019-01-16 DIAGNOSIS — H35.3190 NONEXUDATIVE AGE-RELATED MACULAR DEGENERATION, UNSPECIFIED LATERALITY, UNSPECIFIED STAGE: ICD-10-CM

## 2019-01-16 DIAGNOSIS — J31.0 CHRONIC RHINITIS: ICD-10-CM

## 2019-01-16 PROCEDURE — 99213 OFFICE O/P EST LOW 20 MIN: CPT | Mod: 25 | Performed by: FAMILY MEDICINE

## 2019-01-16 PROCEDURE — G0123 SCREEN CERV/VAG THIN LAYER: HCPCS | Performed by: FAMILY MEDICINE

## 2019-01-16 PROCEDURE — 99396 PREV VISIT EST AGE 40-64: CPT | Mod: 25 | Performed by: FAMILY MEDICINE

## 2019-01-16 PROCEDURE — 87624 HPV HI-RISK TYP POOLED RSLT: CPT | Performed by: FAMILY MEDICINE

## 2019-01-16 PROCEDURE — 90471 IMMUNIZATION ADMIN: CPT | Performed by: FAMILY MEDICINE

## 2019-01-16 PROCEDURE — 90750 HZV VACC RECOMBINANT IM: CPT | Performed by: FAMILY MEDICINE

## 2019-01-16 RX ORDER — FLUTICASONE PROPIONATE 50 MCG
1-2 SPRAY, SUSPENSION (ML) NASAL DAILY
Qty: 16 ML | Refills: 11 | Status: SHIPPED | OUTPATIENT
Start: 2019-01-16

## 2019-01-16 ASSESSMENT — ENCOUNTER SYMPTOMS
EYE PAIN: 0
WEAKNESS: 0
HEMATOCHEZIA: 0
COUGH: 0
CHILLS: 0
SORE THROAT: 0
FEVER: 0
DYSURIA: 0
DIARRHEA: 0
HEARTBURN: 0
PARESTHESIAS: 0
ARTHRALGIAS: 0
DIZZINESS: 0
SHORTNESS OF BREATH: 0
PALPITATIONS: 0
MYALGIAS: 0
FREQUENCY: 0
JOINT SWELLING: 0
ABDOMINAL PAIN: 0
HEADACHES: 0
NERVOUS/ANXIOUS: 0
CONSTIPATION: 0
NAUSEA: 0
HEMATURIA: 0
BREAST MASS: 0

## 2019-01-16 NOTE — PROGRESS NOTES
SUBJECTIVE:   CC: Raven Mahmood is an 63 year old woman who presents for preventive health visit.     Physical   Annual:     Getting at least 3 servings of Calcium per day:  Yes    Bi-annual eye exam:  Yes    Dental care twice a year:  Yes    Sleep apnea or symptoms of sleep apnea:  Excessive snoring    Diet:  Regular (no restrictions)    Frequency of exercise:  2-3 days/week    Duration of exercise:  15-30 minutes    Taking medications regularly:  Not Applicable    Additional concerns today:  Yes      PROBLEMS TO ADD ON...  1) Snoring.  Her snoring has worsened and  cannot sleep in the bedroom with her.  She denies excessive daytime sleepiness.  She feels she sleeps well and is rested upon awakening.  Her dentist suggested a mandibular advancement device.    2) vision concerns.  She was recently told by optometrist that she has early dry macular degeneration.  This runs in her family.  She found an article in the Journal of Endocrinology (2014; 223, R9-R18) indicating that estrogen (HRT) can be beneficial for ocular function.      Today's PHQ-2 Score:   PHQ-2 ( 1999 [employer]) 1/16/2019   Q1: Little interest or pleasure in doing things 0   Q2: Feeling down, depressed or hopeless 0   PHQ-2 Score 0   Q1: Little interest or pleasure in doing things Not at all   Q2: Feeling down, depressed or hopeless Not at all   PHQ-2 Score 0       Abuse: Current or Past(Physical, Sexual or Emotional)- No  Do you feel safe in your environment? Yes    Social History     Tobacco Use     Smoking status: Never Smoker     Smokeless tobacco: Never Used   Substance Use Topics     Alcohol use: Yes     Comment: 2 drinks a week     Alcohol Use 1/16/2019   If you drink alcohol do you typically have greater than 3 drinks per day OR greater than 7 drinks per week? No       Reviewed orders with patient.  Reviewed health maintenance and updated orders accordingly - Yes  BP Readings from Last 3 Encounters:   01/16/19 128/64   09/20/17 97/62    07/22/17 90/55    Wt Readings from Last 3 Encounters:   01/16/19 63 kg (139 lb)   09/20/17 61.7 kg (136 lb)   07/22/17 64.4 kg (142 lb)             Pertinent mammograms are reviewed under the imaging tab.  History of abnormal Pap smear:  PAP / HPV 10/19/2015 10/15/2012 4/8/2010   PAP NIL NIL NIL     Reviewed and updated as needed this visit by clinical staff  Tobacco  Allergies  Meds  Med Hx  Surg Hx  Fam Hx  Soc Hx        Reviewed and updated as needed this visit by Provider  Meds  Med Hx  Surg Hx  Fam Hx        Past Medical History:   Diagnosis Date     Dysplasia of cervix, unspecified     cervical dysplasia - in [facility]     Female stress incontinence     mild stress incontinence     Hemorrhoids      Irritable bowel syndrome      Mucous polyp of cervix     cervical polyp 12:00, seen by GYN     Osteopenia 8/06    DEXA     Other specified temporomandibular joint disorders      Pelvic floor relaxation     pessary     Tinnitus 11/06    Dr. Conti      Past Surgical History:   Procedure Laterality Date     COLONOSCOPY  12/17/07    WNL, sister with polyps     COLONOSCOPY  1/17/2013    Procedure: COLONOSCOPY;  COLONOSCOPY;  Surgeon: Roland Gee MD;  Location:  GI     DAVINCI HYSTERECTOMY SUPRACERVICAL N/A 7/21/2017    Procedure: DAVINCI XI HYSTERECTOMY SUPRACERVICAL;  DAVINCI XI ROBOTIC ASSISTED SUPRACERVICAL LAPAROSCOPIC HYSTERECTOMY, BILATERAL SALPINGO-OOPHORECTOMIES (DR. WINSTON) DAVINCI XI ROBOTIC ASSISTED SACROCOLPOPEXY, LYSIS OF ADHESIONS, MIDURETHRAL SLING, CYSTOSCOPY (SANJIV);  Surgeon: Payam Winston MD;  Location:  OR     DAVINCDENISSE LYSIS OF ADHESIONS N/A 7/21/2017    Procedure: DAVINCI LYSIS OF ADHESIONS;;  Surgeon: Payam Winston MD;  Location:  OR     DAVINCI SACROCOLPOPEXY, MIDURETHRAL SLING, CYSTOSCOPY  7/21/2017    Procedure: DAVINCI XI SACROCOLPOPEXY, MIDURETHRAL SLING, CYSTOSCOPY;;  Surgeon: Minnie Bailey MD;  Location: [facility] OR     DAVINCI  SALPINGO-OOPHORECTOMY INCLUDING BILATERAL N/A 2017    Procedure: PASCUAL XI SALPINGO-OOPHORECTOMY INCLUDING BILATERAL;;  Surgeon: Payam Ceballos MD;  Location:  OR     ESOPHAGOSCOPY, GASTROSCOPY, DUODENOSCOPY (EGD), COMBINED N/A 2016    Procedure: COMBINED ESOPHAGOSCOPY, GASTROSCOPY, DUODENOSCOPY (EGD), BIOPSY SINGLE OR MULTIPLE;  Surgeon: Segrio Nguyễn MD;  Location:  GI     STRESS ECHO (METRO)      negative, at [facility]     Obstetric History       T1      L1     SAB0   TAB0   Ectopic0   Multiple0   Live Births0       # Outcome Date GA Lbr Jarret/2nd Weight Sex Delivery Anes PTL Lv   1 Term               Obstetric Comments    x 1       Review of Systems   Constitutional: Negative for chills and fever.   HENT: Negative for congestion, ear pain, hearing loss and sore throat.    Eyes: Positive for visual disturbance. Negative for pain.   Respiratory: Negative for cough and shortness of breath.    Cardiovascular: Negative for palpitations and peripheral edema.   Gastrointestinal: Negative for abdominal pain, constipation, diarrhea, heartburn, hematochezia and nausea.   Breasts:  Negative for tenderness, breast mass and discharge.   Genitourinary: Negative for dysuria, frequency, genital sores, hematuria, pelvic pain, urgency, vaginal bleeding and vaginal discharge.   Musculoskeletal: Negative for arthralgias, joint swelling and myalgias.   Skin: Negative for rash.   Neurological: Negative for dizziness, weakness, headaches and paresthesias.   Psychiatric/Behavioral: Negative for mood changes. The patient is not nervous/anxious.           OBJECTIVE:   /64 (BP Location: Left arm, Patient Position: Sitting, Cuff Size: Adult Regular)   Pulse 72   Temp 98.4  F (36.9  C) (Oral)   Resp 18   Wt 63 kg (139 lb)   LMP 2005   SpO2 95%   BMI 21.13 kg/m    Physical Exam  GENERAL APPEARANCE: healthy, alert and no distress  EYES: Eyes grossly normal to inspection, PERRL and  conjunctivae and sclerae normal  HENT: ear canals and TM's normal, nose and mouth without ulcers or lesions, oropharynx clear and oral mucous membranes moist  NECK: no adenopathy, no asymmetry, masses, or scars and thyroid normal to palpation  RESP: lungs clear to auscultation - no rales, rhonchi or wheezes  BREAST: normal without masses, tenderness or nipple discharge and no palpable axillary masses or adenopathy  CV: regular rate and rhythm, normal S1 S2, no S3 or S4, no murmur, click or rub, no peripheral edema and peripheral pulses strong  ABDOMEN: soft, nontender, no hepatosplenomegaly, no masses and bowel sounds normal   (female): normal female external genitalia, normal urethral meatus, vaginal mucosal atrophy noted, normal cervix with no discharge  MS: no musculoskeletal defects are noted and gait is age appropriate without ataxia  SKIN: no suspicious lesions or rashes  NEURO: Normal strength and tone, sensory exam grossly normal, mentation intact and speech normal  PSYCH: mentation appears normal and affect normal/bright      ASSESSMENT/PLAN:     1. Routine general medical examination at a health care facility  - Pap imaged thin layer screen with HPV - recommended age 30 - 65 years (select HPV order below)  - HPV High Risk Types DNA Cervical    2. Snoring  I recommended further eval at sleep clinic.  If this is isolated snoring then she may be a candidate for a mandibular advancing device.    - SLEEP EVALUATION & MANAGEMENT REFERRAL - ADULT -[facility] [phone]  (Age 18 and up); Future    3. Nonexudative age-related macular degeneration, unspecified laterality, unspecified stage  The Endocrinology article she has brought in with her is a review article that discusses the protective effect of estrogen on vision.  This is focused primarily on endogenous estrogen and does not suggest that hormone replacement therapy would prevent vision loss.  I recommended she see an  "ophthalmologist and discuss this further as well as other interventions she may be able to do to prevent progression of her macular degeneration.  If ophthalmology feels ERT would be helpful we could consider that.  Now that she's had a hysterectomy she would not need combined HRT.    - OPHTHALMOLOGY ADULT REFERRAL    4. Chronic rhinitis  - fluticasone (FLONASE) 50 MCG/ACT nasal spray; Spray 1-2 sprays into both nostrils daily  Dispense: 16 mL; Refill: 11    5. Need for zoster vaccination  - ZOSTER VACCINE RECOMBINANT ADJUVANTED IM NJX  - ADMIN 1st VACCINE    COUNSELING:  Reviewed preventive health counseling, as reflected in patient instructions      Estimated body mass index is 21.13 kg/m  as calculated from the following:    Height as of 9/20/17: 1.727 m (5' 8\").    Weight as of this encounter: 63 kg (139 lb).    BP Screening:   Last 3 BP Readings:    BP Readings from Last 3 Encounters:   01/16/19 128/64   09/20/17 97/62   07/22/17 90/55   The following was recommended to the patient:  Re-screen BP within a year and recommended lifestyle modifications      Counseling Resources:  ATP IV Guidelines  Pooled Cohorts Equation Calculator  Breast Cancer Risk Calculator  FRAX Risk Assessment  ICSI Preventive Guidelines  Dietary Guidelines for Americans, 2010  [employer]'s MyPlate  ASA Prophylaxis  Lung CA Screening    Roshni Will MD  [facility]    ADDENDUM: Patient subsequently saw an ophthalmologist who informed her she has no evidence of any macular degeneration.    Roshni Will MD   03/11/19   "

## 2019-01-16 NOTE — NURSING NOTE
Screening Questionnaire for Adult Immunization    Are you sick today?   No   Do you have allergies to medications, food, a vaccine component or latex?   No   Have you ever had a serious reaction after receiving a vaccination?   No   Do you have a long-term health problem with heart disease, lung disease, asthma, kidney disease, metabolic disease (e.g. diabetes), anemia, or other blood disorder?   No   Do you have cancer, leukemia, HIV/AIDS, or any other immune system problem?   No   In the past 3 months, have you taken medications that affect  your immune system, such as prednisone, other steroids, or anticancer drugs; drugs for the treatment of rheumatoid arthritis, Crohn s disease, or psoriasis; or have you had radiation treatments?   No   Have you had a seizure, or a brain or other nervous system problem?   No   During the past year, have you received a transfusion of blood or blood     products, or been given immune (gamma) globulin or antiviral drug?   No   For women: Are you pregnant or is there a chance you could become        pregnant during the next month?   No   Have you received any vaccinations in the past 4 weeks?   No     Immunization questionnaire answers were all negative.        Per orders of Dr. Lyn, injection of Shingrix given by Liyah Viera. Patient instructed to remain in clinic for 15 minutes afterwards, and to report any adverse reaction to me immediately.       Screening performed by Liyah Viera on 1/16/2019 at 10:36 AM.

## 2019-01-18 LAB
COPATH REPORT: NORMAL
COPATH REPORT: NORMAL
PAP: NORMAL
PAP: NORMAL

## 2019-01-21 LAB
FINAL DIAGNOSIS: NORMAL
HPV HR 12 DNA CVX QL NAA+PROBE: NEGATIVE
HPV16 DNA SPEC QL NAA+PROBE: NEGATIVE
HPV18 DNA SPEC QL NAA+PROBE: NEGATIVE
SPECIMEN DESCRIPTION: NORMAL
SPECIMEN SOURCE CVX/VAG CYTO: NORMAL

## 2019-01-27 DIAGNOSIS — J31.0 CHRONIC RHINITIS: ICD-10-CM

## 2019-01-28 NOTE — TELEPHONE ENCOUNTER
"Requested Prescriptions   Pending Prescriptions Disp Refills     fluticasone (FLONASE) 50 MCG/ACT nasal spray [Pharmacy Med Name: FLUTICASONE 50MCG NASAL SP (120) RX]  Last Written Prescription Date:  1/16/2019  Last Fill Quantity: 16ml,  # refills: 11   Last Office Visit: 1/16/2019   Future Office Visit:      16 mL 0     Sig: INSTILL 1 TO 2 SPRAYS IN EACH NOSTRIL DAILY    Inhaled Steroids Protocol Passed - 1/27/2019  3:36 AM       Passed - Patient is age 12 or older       Passed - Recent (12 mo) or future (30 days) visit within the authorizing provider's specialty    Patient had office visit in the last 12 months or has a visit in the next 30 days with authorizing provider or within the authorizing provider's specialty.  See \"Patient Info\" tab in inbasket, or \"Choose Columns\" in Meds & Orders section of the refill encounter.           Passed - Medication is active on med list          "

## 2019-01-29 RX ORDER — FLUTICASONE PROPIONATE 50 MCG
SPRAY, SUSPENSION (ML) NASAL
Qty: 0.01 ML | Refills: 0 | OUTPATIENT
Start: 2019-01-29

## 2019-01-29 NOTE — TELEPHONE ENCOUNTER
Refused Prescriptions:                       Disp   Refills    fluticasone (FLONASE) 50 MCG/ACT nasal spr*0.01 mL0        Sig: INSTILL 1 TO 2 SPRAYS IN EACH NOSTRIL DAILY  Refused By: JENNIFER DALE  Reason for Refusal: Duplicate      Closing encounter - no further actions needed at this time    Jennifer Dale RN

## 2019-02-04 ENCOUNTER — HOSPITAL ENCOUNTER (OUTPATIENT)
Dept: MAMMOGRAPHY | Facility: CLINIC | Age: 64
Discharge: HOME | End: 2019-02-04
Attending: FAMILY MEDICINE | Admitting: FAMILY MEDICINE
Payer: COMMERCIAL

## 2019-02-04 DIAGNOSIS — Z12.31 VISIT FOR SCREENING MAMMOGRAM: ICD-10-CM

## 2019-02-04 PROCEDURE — 77063 BREAST TOMOSYNTHESIS BI: CPT

## 2019-02-15 ENCOUNTER — HEALTH MAINTENANCE LETTER (OUTPATIENT)
Age: 64
End: 2019-02-15

## 2019-02-18 ENCOUNTER — OFFICE VISIT (OUTPATIENT)
Dept: SLEEP MEDICINE | Facility: CLINIC | Age: 64
End: 2019-02-18
Payer: COMMERCIAL

## 2019-02-18 VITALS
WEIGHT: 139 LBS | OXYGEN SATURATION: 98 % | BODY MASS INDEX: 21.07 KG/M2 | RESPIRATION RATE: 16 BRPM | HEART RATE: 84 BPM | HEIGHT: 68 IN | SYSTOLIC BLOOD PRESSURE: 98 MMHG | DIASTOLIC BLOOD PRESSURE: 61 MMHG

## 2019-02-18 DIAGNOSIS — R06.83 SNORING: ICD-10-CM

## 2019-02-18 PROCEDURE — 99244 OFF/OP CNSLTJ NEW/EST MOD 40: CPT | Performed by: PHYSICIAN ASSISTANT

## 2019-02-18 ASSESSMENT — MIFFLIN-ST. JEOR: SCORE: 1239

## 2019-02-18 NOTE — PATIENT INSTRUCTIONS
"MY TREATMENT INFORMATION FOR SLEEP APNEA-  Raven Mahmood    DOCTOR : Bennett Ezra Goltz, PA-C  SLEEP CENTER : [facility]     MY CONTACT NUMBER: [phone]    Am I having a sleep study at a sleep center?  Make sure you have an appointment for the study before you leave!    Am I having a home sleep study?  Watch this video:  https://www.Serveron.com/watch?v=CteI_[email or web address]&[email or web address]  Please verify your insurance coverage with your insurance carrier    Frequently asked questions:  1. What is Obstructive Sleep Apnea (JORDON)? JORDON is the most common type of sleep apnea. Apnea means, \"without breath.\"  Apnea is most often caused by narrowing or collapse of the upper airway as muscles relax during sleep.   Almost everyone has occasional apneas. Most people with sleep apnea have had brief interruptions at night frequently for many years.  The severity of sleep apnea is related to how frequent and severe the events are.   2. What are the consequences of JORDON? Symptoms include: feeling sleepy during the day, snoring loudly, gasping or stopping of breathing, trouble sleeping, and occasionally morning headaches or heartburn at night.  Sleepiness can be serious and even increase the risk of falling asleep while driving. Other health consequences may include development of high blood pressure and other cardiovascular disease in persons who are susceptible. Untreated JORDON can contribute to heart disease, stroke and diabetes.   3. What are the treatment options? In most situations, sleep apnea is a lifelong disease that must be managed with daily therapy. Medications are not effective for sleep apnea and surgery is generally not considered until other therapies have been tried. Your treatment is your choice. Continuous Positive Airway (CPAP) works right away and is the therapy that is effective in nearly everyone. An oral device to hold your jaw forward is usually the next most reliable option. Other options " include postioning devices (to keep you off your back), weight loss, and surgery including a tongue pacing device. There is more detail about some of these options below.    Important tips for using CPAP and similar devices   Know your equipment:  CPAP is continuous positive airway pressure that prevents obstructive sleep apnea by keeping the throat from collapsing while you are sleeping. In most cases, the device is  smart  and can slowly self-adjusts if your throat collapses and keeps a record every day of how well you are treated-this information is available to you and your care team.  BPAP is bilevel positive airway pressure that keeps your throat open and also assists each breath with a pressure boost to maintain adequate breathing.  Special kinds of BPAP are used in patients who have inadequate breathing from lung or heart disease. In most cases, the device is  smart  and can slowly self-adjusts to assist breathing. Like CPAP, the device keeps a record of how well you are treated.  Your mask is your connection to the device. You get to choose what feels most comfortable and the staff will help to make sure if fits. Here: are some examples of the different masks that are available:       Key points to remember on your journey with sleep apnea:  1. Sleep study.  PAP devices often need to be adjusted during a sleep study to show that they are effective and adjusted right.  2. Good tips to remember: Try wearing just the mask during a quiet time during the day so your body adapts to wearing it. A humidifier is recommended for comfort in most cases to prevent drying of your nose and throat. Allergy medication from your provider may help you if you are having nasal congestion.  3. Getting settled-in. It takes more than one night for most of us to get used to wearing a mask. Try wearing just the mask during a quiet time during the day so your body adapts to wearing it. A humidifier is recommended for comfort in most  cases. Our team will work with you carefully on the first day and will be in contact within 4 days and again at 2 and 4 weeks for advice and remote device adjustments. Your therapy is evaluated by the device each day.   4. Use it every night. The more you are able to sleep naturally for 7-8 hours, the more likely you will have good sleep and to prevent health risks or symptoms from sleep apnea. Even if you use it 4 hours it helps. Occasionally all of us are unable to use a medical therapy, in sleep apnea, it is not dangerous to miss one night.   5. Communicate. Call our skilled team on the number provided on the first day if your visit for problems that make it difficult to wear the device. Over 2 out of 3 patients can learn to wear the device long-term with help from our team. Remember to call our team or your sleep providers if you are unable to wear the device as we may have other solutions for those who cannot adapt to mask CPAP therapy. It is recommended that you sleep your sleep provider within the first 3 months and yearly after that if you are not having problems.   Take care of your equipment. Make sure you clean your mask and tubing using directions every day and that your filter and mask are replaced as recommended or if they are not working.     BESIDES CPAP, WHAT OTHER THERAPIES ARE THERE?    Positioning Device  Positioning devices are generally used when sleep apnea is mild and only occurs on your back.This example shows a pillow that straps around the waist. It may be appropriate for those whose sleep study shows milder sleep apnea that occurs primarily when lying flat on one's back. Preliminary studies have shown benefit but effectiveness at home may need to be verified by a home sleep test. These devices are generally not covered by medical insurance.  Examples of devices that maintain sleeping on the back to prevent snoring and mild sleep apnea.    Belt type body  positioner  Http://ii4b.Leaguevine/    Electronic reminder  Http://nightshifttherapy.com/  Http://www.Mobile Embrace.com.au/    Oral Appliance  What is oral appliance therapy?  An oral appliance device fits on your teeth at night like a retainer used after having braces. The device is made by a specialized dentist and requires several visits over 1-2 months before a manufactured device is made to fit your teeth and is adjusted to prevent your sleep apnea. Once an oral device is working properly, snoring should be improved. A home sleep test may be recommended at that time if to determine whether the sleep apnea is adequately treated.       Some things to remember:  -Oral devices are often, but not always, covered by your medical insurance. Be sure to check with your insurance provider.   -If you are referred for oral therapy, you will be given a list of specialized dentists to consider or you may choose to visit the Web site of the American Academy of Dental Sleep Medicine  -Oral devices are less likely to work if you have severe sleep apnea or are extremely overweight.     More detailed information  An oral appliance is a small acrylic device that fits over the upper and lower teeth  (similar to a retainer or a mouth guard). This device slightly moves jaw forward, which moves the base of the tongue forward, opens the airway, improves breathing for effective treat snoring and obstructive sleep apnea in perhaps 7 out of 10 people .  The best working devices are custom-made by a dental device  after a mold is made of the teeth 1, 2, 3.  When is an oral appliance indicated?  Oral appliance therapy is recommended as a first-line treatment for patients with primary snoring, mild sleep apnea, and for patients with moderate sleep apnea who prefer appliance therapy to use of CPAP4, 5. Severity of sleep apnea is determined by sleep testing and is based on the number of respiratory events per hour of sleep.   How successful  is oral appliance therapy?  The success rate of oral appliance therapy in patients with mild sleep apnea is 75-80% while in patients with moderate sleep apnea it is 50-70%. The chance of success in patients with severe sleep apnea is 40-50%. The research also shows that oral appliances have a beneficial effect on the cardiovascular health of JORDON patients at the same magnitude as CPAP therapy7.  Oral appliances should be a second-line treatment in cases of severe sleep apnea, but if not completely successful then a combination therapy utilizing CPAP plus oral appliance therapy may be effective. Oral appliances tend to be effective in a broad range of patients although studies show that the patients who have the highest success are females, younger patients, those with milder disease, and less severe obesity. 3, 6.   Finding a dentist that practices dental sleep medicine  Specific training is available through the American Academy of Dental Sleep Medicine for dentists interested in working in the field of sleep. To find a dentist who is educated in the field of sleep and the use of oral appliances, near you, visit the Web site of the American Academy of Dental Sleep Medicine.    References  1. Cele et al. Objectively measured vs self-reported compliance during oral appliance therapy for sleep-disordered breathing. Chest 2013; 144(5): 4148-8223.  2. Nicci et al. Objective measurement of compliance during oral appliance therapy for sleep-disordered breathing. Thorax 2013; 68(1): 91-96.  3. Corona, et al. Mandibular advancement devices in 620 men and women with JORDON and snoring: tolerability and predictors of treatment success. Chest 2004; 125: 2751-0686.  4. Marylou, et al. Oral appliances for snoring and JORDON: a review. Sleep 2006; 29: 244-262.  5. Caro, et al. Oral appliance treatment for JORDON: an update. J Clin Sleep Med 2014; 10(2): 215-227.  6. Johana et al. Predictors of OSAH treatment  outcome. J Thayer Res 2007; 86: 4496-0021.    Weight Loss:    Weight loss is a long-term strategy that may improve sleep apnea in some patients.    Weight management is a personal decision and the decision should be based on your interest and the potential benefits.  If you are interested in exploring weight loss strategies, the following discussion covers the impact on weight loss on sleep apnea and the approaches that may be successful.    Being overweight does not necessarily mean you will have health consequences.  Those who have BMI over 35 or over 27 with existing medical conditions carries greater risk.   Weight loss decreases severity of sleep apnea in most people with obesity. For those with mild obesity who have developed snoring with weight gain, even 15-30 pound weight loss can improve and occasionally eliminate sleep apnea.  Structured and life-long dietary and health habits are necessary to lose weight and keep healthier weight levels.     Though there may be significant health benefits from weight loss, long-term weight loss is very difficult to achieve- studies show success with dietary management in less than 10% of people. In addition, substantial weight loss may require years of dietary control and may be difficult if patients have severe obesity. In these cases, surgical management may be considered.  Finally, older individuals who have tolerated obesity without health complications may be less likely to benefit from weight loss strategies.      Your BMI is Body mass index is 21.13 kg/m .  Weight management is a personal decision.  If you are interested in exploring weight loss strategies, the following discussion covers the approaches that may be successful. Body mass index (BMI) is one way to tell whether you are at a healthy weight, overweight, or obese. It measures your weight in relation to your height.  A BMI of 18.5 to 24.9 is in the healthy range. A person with a BMI of 25 to 29.9 is  considered overweight, and someone with a BMI of 30 or greater is considered obese. More than two-thirds of American adults are considered overweight or obese.  Being overweight or obese increases the risk for further weight gain. Excess weight may lead to heart disease and diabetes.  Creating and following plans for healthy eating and physical activity may help you improve your health.  Weight control is part of healthy lifestyle and includes exercise, emotional health, and healthy eating habits. Careful eating habits lifelong are the mainstay of weight control. Though there are significant health benefits from weight loss, long-term weight loss with diet alone may be very difficult to achieve- studies show long-term success with dietary management in less than 10% of people. Attaining a healthy weight may be especially difficult to achieve in those with severe obesity. In some cases, medications, devices and surgical management might be considered.  What can you do?  If you are overweight or obese and are interested in methods for weight loss, you should discuss this with your provider.     Consider reducing daily calorie intake by 500 calories.     Keep a food journal.     Avoiding skipping meals, consider cutting portions instead.    Diet combined with exercise helps maintain muscle while optimizing fat loss. Strength training is particularly important for building and maintaining muscle mass. Exercise helps reduce stress, increase energy, and improves fitness. Increasing exercise without diet control, however, may not burn enough calories to loose weight.       Start walking three days a week 10-20 minutes at a time    Work towards walking thirty minutes five days a week     Eventually, increase the speed of your walking for 1-2 minutes at time    In addition, we recommend that you review healthy lifestyles and methods for weight loss available through the National Institutes of Health patient information  sites:  http://win.niddk.nih.gov/publications/index.htm    And look into health and wellness programs that may be available through your health insurance provider, employer, local community center, or gissel club.    Surgery:  Surgery for obstructive sleep apnea is considered generally only when other therapies fail to work. Surgery may be discussed with you if you are having a difficult time tolerating CPAP and or when there is an abnormal structure that requires surgical correction.  Nose and throat surgeries often enlarge the airway to prevent collapse.  Most of these surgeries create pain for 1-2 weeks and up to half of the most common surgeries are not effective throughout life.  You should carefully discuss the benefits and drawbacks to surgery with your sleep provider and surgeon to determine if it is the best solution for you.   More information  Surgery for JORDON is directed at areas that are responsible for narrowing or complete obstruction of the airway during sleep.  There are a wide range of procedures available to enlarge and/or stabilize the airway to prevent blockage of breathing in the three major areas where it can occur: the palate, tongue, and nasal regions.  Successful surgical treatment depends on the accurate identification of the factors responsible for obstructive sleep apnea in each person.  A personalized approach is required because there is no single treatment that works well for everyone.  Because of anatomic variation, consultation with an examination by a sleep surgeon is a critical first step in determining what surgical options are best for each patient.  In some cases, examination during sedation may be recommended in order to guide the selection of procedures.  Patients will be counseled about risks and benefits as well as the typical recovery course after surgery. Surgery is typically not a cure for a person s JORDON.  However, surgery will often significantly improve one s JORDON  severity (termed  success rate ).  Even in the absence of a cure, surgery will decrease the cardiovascular risk associated with OSA7; improve overall quality of life8 (sleepiness, functionality, sleep quality, etc).  Palate Procedures:  Patients with JORDON often have narrowing of their airway in the region of their tonsils and uvula.  The goals of palate procedures are to widen the airway in this region as well as to help the tissues resist collapse.  Modern palate procedure techniques focus on tissue conservation and soft tissue rearrangement, rather than tissue removal.  Often the uvula is preserved in this procedure. Residual sleep apnea is common in patient after pharyngoplasty with an average reduction in sleep apnea events of 33%2.    Tongue Procedures:  ExamWhile patients are awake, the muscles that surround the throat are active and keep this region open for breathing. These muscles relax during sleep, allowing the tongue and other structures to collapse and block breathing.  There are several different tongue procedures available.  Selection of a tongue base procedure depends on characteristics seen on physical exam.  Generally, procedures are aimed at removing bulky tissues in this area or preventing the back of the tongue from falling back during sleep.  Success rates for tongue surgery range from 50-62%3.  Hypoglossal Nerve Stimulation:  Hypoglossal nerve stimulation has recently received approval from the [place] Food and Drug Administration for the treatment of obstructive sleep apnea.  This is based on research showing that the system was safe and effective in treating sleep apnea6.  Results showed that the median AHI score decreased 68%, from 29.3 to 9.0. This therapy uses an implant system that senses breathing patterns and delivers mild stimulation to airway muscles, which keeps the airway open during sleep.  The system consists of three fully implanted components: a small generator (similar in  size to a pacemaker), a breathing sensor, and a stimulation lead.  Using a small handheld remote, a patient turns the therapy on before bed and off upon awakening.    Candidates for this device must be greater than 22 years of age, have moderate to severe JORDON (AHI between 20-65), BMI less than 32, have tried CPAP/oral appliance without success, and have appropriate upper airway anatomy (determined by a sleep endoscopy performed by Dr. Sharma).  Hypoglossal Nerve Stimulation Pathway:    The sleep surgeon s office will work with the patient through the insurance prior-authorization process (including communications and appeals).    Nasal Procedures:  Nasal obstruction can interfere with nasal breathing during the day and night.  Studies have shown that relief of nasal obstruction can improve the ability of some patients to tolerate positive airway pressure therapy for obstructive sleep apnea1.  Treatment options include medications such as nasal saline, topical corticosteroid and antihistamine sprays, and oral medications such as antihistamines or decongestants. Non-surgical treatments can include external nasal dilators for selected patients. If these are not successful by themselves, surgery can improve the nasal airway either alone or in combination with these other options.  Combination Procedures:  Combination of surgical procedures and other treatments may be recommended, particularly if patients have more than one area of narrowing or persistent positional disease.  The success rate of combination surgery ranges from 66-80%2,3.    References  1. Marky LOPEZ. The Role of the Nose in Snoring and Obstructive Sleep Apnoea: An Update.  Eur Arch Otorhinolaryngol. 2011; 268: 1365-73.  2.  Jelena SM; Lupe JA; Karina JR; Pallanch JF; Carlos CAGLE; Pily COOK; Shantell BAUTISTA. Surgical modifications of the upper airway for obstructive sleep apnea in adults: a systematic review and meta-analysis. SLEEP 2010;33(10):2006-3094.  Austin ROWLEY. Hypopharyngeal surgery in obstructive sleep apnea: an evidence-based medicine review.  Arch Otolaryngol Head Neck Surg. 2006 Feb;132(2):206-13.  3. Peng YH1, Syeda Y, Bertrand REVA. The efficacy of anatomically based multilevel surgery for obstructive sleep apnea. Otolaryngol Head Neck Surg. 2003 Oct;129(4):327-35.  4. Austin ROWLEY, Goldberg A. Hypopharyngeal Surgery in Obstructive Sleep Apnea: An Evidence-Based Medicine Review. Arch Otolaryngol Head Neck Surg. 2006 Feb;132(2):206-13.  5. Nancy PJ et al. Upper-Airway Stimulation for Obstructive Sleep Apnea.  N Engl J Med. 2014 Jan 9;370(2):139-49.  6. Catina Y et al. Increased Incidence of Cardiovascular Disease in Middle-aged Men with Obstructive Sleep Apnea. Am J Respir Crit Care Med; 2002 166: 159-165  7. Haydee MITCHELL et al. Studying Life Effects and Effectiveness of Palatopharyngoplasty (SLEEP) study: Subjective Outcomes of Isolated Uvulopalatopharyngoplasty. Otolaryngol Head Neck Surg. 2011; 144: 623-631.

## 2019-02-18 NOTE — NURSING NOTE
"Chief Complaint   Patient presents with     Sleep Problem     Would like to lessen snoring       Initial BP 98/61   Pulse 84   Resp 16   Ht 1.727 m (5' 8\")   Wt 63 kg (139 lb)   LMP 09/01/2005   SpO2 98%   BMI 21.13 kg/m   Estimated body mass index is 21.13 kg/m  as calculated from the following:    Height as of this encounter: 1.727 m (5' 8\").    Weight as of this encounter: 63 kg (139 lb).    Medication Reconciliation: complete    Neck circumference: 13 inches / 33 centimeters.    ESS 3    Lalita Beltran MA    "

## 2019-02-18 NOTE — PROGRESS NOTES
Sleep Consultation:    Date on this visit: 2/18/2019    Raven Mahmood is sent by Roshni Will for a sleep consultation regarding snoring.    Primary Physician: Roshni Will     Raven Mahmood presents with excessive snoring for 2 years. Her medical history is non-contributory.     Raven goes to sleep at 11:30 PM during the week. She wakes up at 6:30 AM with an alarm. She falls asleep in 10-20 minutes. She does think she is restless until she falls asleep, but is not sure if she moves much once she falls asleep. Raven denies difficulty falling asleep unless she has a deadline. She will take melatonin if she has not fallen asleep in 20 minutes.  She wakes up 0-1 times a night for 5-10 minutes before falling back to sleep.  Raven wakes up to her partner getting up.  On weekends, Raven goes to sleep at 11:30 PM.  She wakes up at 7-7:30 AM without an alarm. She falls asleep in 20 minutes.  Patient gets an average of 7-8 hours of sleep per night. She feels she sleeps well.    Patient does use electronics in bed, read in bed and work in bed and does not watch TV in bed and worry in bed about anything.     Raven does not do shift work.  She works day shifts.  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 She lives with her .    Raven does snore every night and snoring is soft. She thinks her partner is just a light sleeper.  Patient does have a regular bed partner, but he often goes to a different room in the middle of the night. She has been using Theravent strips and they do help, but aren't very comfortable. Her  travels a lot, so she only uses them when he is home. She has tried a neck brace to keep her neck straight. It was uncomfortable and ineffective. She tried Breathe-Right as well, without effect. She ordered a dental appliance online but it was very uncomfortable. There is not report of gasping and snorting.  She does not have witnessed  apneas.  Patient sleeps on her side and stomach. She has occasional restless legs (very infrequent, does not think of this as an issue), denies snort arousals, morning dry mouth, morning headaches and morning confusion. Raven denies any sleep walking, sleep talking, dream enactment, sleep paralysis, cataplexy and hypnogogic/hypnopompic hallucinations. She has a mouth guard for bruxing.    Raven denies claustrophobia, reflux at night, heartburn and depression.  She does have mild chronic congestion and post-nasal driainage. She has been using fluticasone.    Raven has not had a change in weight in several years.  Patient describes themself as a night person.  She would prefer to go to sleep at 11:30 PM and wake up at 7:00 AM.  Patient's [place] Sleepiness score 3/24 inconsistent with daytime sleepiness.      Raven does not take naps. She takes rare inadvertant naps in meetings.  She denies dozing while driving.  Patient was counseled on the importance of driving while alert, to pull over if drowsy, or nap before getting into the vehicle if sleepy.  She uses 3 cups/day of coffee. Last caffeine intake is usually before 4 PM.    Allergies:    Allergies   Allergen Reactions     Erythromycin GI Disturbance     Penicillins Swelling and Rash     Tetracycline Rash       Medications:    Current Outpatient Medications   Medication Sig Dispense Refill     fluticasone (FLONASE) 50 MCG/ACT nasal spray Spray 1-2 sprays into both nostrils daily 16 mL 11       Problem List:  Patient Active Problem List    Diagnosis Date Noted     Pelvic somatic dysfunction 10/06/2017     Priority: Medium     Urinary incontinence 10/06/2017     Priority: Medium     Cystocele 07/21/2017     Priority: Medium     Uterine prolapse 07/18/2017     Priority: Medium     Midline cystocele 07/18/2017     Priority: Medium     Pelvic floor relaxation      Priority: Medium     pessary       Chronic rhinitis 07/08/2016     Priority: Medium     Mucous polyp of  cervix      Priority: Medium     cervical polyp 12:00, seen by GYN       Elizabeth      Priority: Medium     DEXA 8/2006: lowest T = -1.0  DEXA 9/2017: lowest T = -1.8        Asymptomatic postmenopausal status 08/03/2006     Priority: Medium     Problem list name updated by automated process. Provider to review       Mixed incontinence urge and stress (male)(female) 08/03/2006     Priority: Medium        Past Medical/Surgical History:  Past Medical History:   Diagnosis Date     Dysplasia of cervix, unspecified     cervical dysplasia - in [facility]     Female stress incontinence     mild stress incontinence     Hemorrhoids      Irritable bowel syndrome      Mucous polyp of cervix     cervical polyp 12:00, seen by GYN     Osteopenia 8/06    DEXA     Other specified temporomandibular joint disorders      Pelvic floor relaxation     pessary     Tinnitus 11/06    Dr. Conti     Past Surgical History:   Procedure Laterality Date     COLONOSCOPY  12/17/07    WNL, sister with polyps     COLONOSCOPY  1/17/2013    Procedure: COLONOSCOPY;  COLONOSCOPY;  Surgeon: Roland Gee MD;  Location:  GI     DAVINCI HYSTERECTOMY SUPRACERVICAL N/A 7/21/2017    Procedure: DAVINCI XI HYSTERECTOMY SUPRACERVICAL;  DAVINCI XI ROBOTIC ASSISTED SUPRACERVICAL LAPAROSCOPIC HYSTERECTOMY, BILATERAL SALPINGO-OOPHORECTOMIES (DR. WINSTON) DAVINCI XI ROBOTIC ASSISTED SACROCOLPOPEXY, LYSIS OF ADHESIONS, MIDURETHRAL SLING, CYSTOSCOPY (SANJIV);  Surgeon: Payam Winston MD;  Location:  OR     DAVINCI LYSIS OF ADHESIONS N/A 7/21/2017    Procedure: DAVINCI LYSIS OF ADHESIONS;;  Surgeon: Payam Winston MD;  Location: [facility] OR     DAVINCI SACROCOLPOPEXY, MIDURETHRAL SLING, CYSTOSCOPY  7/21/2017    Procedure: DAVINCI XI SACROCOLPOPEXY, MIDURETHRAL SLING, CYSTOSCOPY;;  Surgeon: Minnie Bailey MD;  Location: [facility] OR     DAVINCI SALPINGO-OOPHORECTOMY INCLUDING BILATERAL N/A 7/21/2017    Procedure: DAVINCI XI SALPINGO-OOPHORECTOMY  INCLUDING BILATERAL;;  Surgeon: Payam Ceballos MD;  Location:  OR     ESOPHAGOSCOPY, GASTROSCOPY, DUODENOSCOPY (EGD), COMBINED N/A 2016    Procedure: COMBINED ESOPHAGOSCOPY, GASTROSCOPY, DUODENOSCOPY (EGD), BIOPSY SINGLE OR MULTIPLE;  Surgeon: Sergio Nguyễn MD;  Location:  GI     STRESS ECHO (METRO)      negative, at [facility]       Social History:  Social History     Socioeconomic History     Marital status:      Spouse name: lisbeth carreno     Number of children: 1     Years of education: 24     Highest education level: Not on file   Social Needs     Financial resource strain: Not on file     Food insecurity - worry: Not on file     Food insecurity - inability: Not on file     Transportation needs - medical: Not on file     Transportation needs - non-medical: Not on file   Occupational History     Occupation: professor     Employer: [employer]     Comment:  - [employer]   Tobacco Use     Smoking status: Never Smoker     Smokeless tobacco: Never Used   Substance and Sexual Activity     Alcohol use: Yes     Comment: 2 drinks a week at most     Drug use: No     Sexual activity: Yes     Partners: Male   Other Topics Concern     Parent/sibling w/ CABG, MI or angioplasty before 65F 55M? No   Social History Narrative     Not on file       Family History:  Family History   Problem Relation Age of Onset     Cerebrovascular Disease Mother          age 64, AVM     Respiratory Father         pulmonary fibrosis     Cancer Maternal Grandmother         stomach cancer and uterine cancer     Macular Degeneration Maternal Grandmother         macular degeneration disease     Neurologic Disorder Maternal Grandfather         parkinson's     Alzheimer Disease Paternal Grandmother      Respiratory Paternal Grandmother         pulmonary fibrosis     Alzheimer Disease Paternal Grandfather      Breast Cancer Other 30        paternal cousin     Lipids Maternal Aunt      Macular Degeneration  "Maternal Aunt         macular degeneration disease     Macular Degeneration Maternal Aunt         macular degeneration disease     Schizophrenia Paternal Uncle        Review of Systems:  A complete review of systems reviewed by me is negative with the exeption of what has been mentioned in the history of present illness.  CONSTITUTIONAL: NEGATIVE for weight gain/loss, fever, chills, sweats or night sweats.  CONSTITUTIONAL:  POSITIVE for  drug allergies PCN  EYES: NEGATIVE for changes in vision, blind spots, double vision.  ENT: NEGATIVE for ear pain, sore throat, sinus pain, runny nose, bloody nose  ENT:  POSITIVE for  post-nasal drip  CARDIAC: NEGATIVE for fast heartbeats or fluttering in chest, chest pain or pressure, breathlessness when lying flat, swollen legs or swollen feet.  NEUROLOGIC: NEGATIVE headaches, weakness or numbness in the arms or legs.  DERMATOLOGIC: NEGATIVE for rashes, new moles or change in mole(s)  PULMONARY: NEGATIVE SOB at rest, SOB with activity, dry cough, productive cough, coughing up blood, wheezing or whistling when breathing.    GASTROINTESTINAL: NEGATIVE for nausea or vomitting, loose or watery stools, fat or grease in stools, constipation, abdominal pain, bowel movements black in color or blood noted.  GENITOURINARY: NEGATIVE for pain during urination, blood in urine, urinating more frequently than usual, irregular menstrual periods.  MUSCULOSKELETAL: NEGATIVE for muscle pain, bone or joint pain, swollen joints.  ENDOCRINE: NEGATIVE for increased thirst or urination, diabetes.  LYMPHATIC: NEGATIVE for swollen lymph nodes, lumps or bumps in the breasts or nipple discharge.    Physical Examination:  Vitals: BP 98/61   Pulse 84   Resp 16   Ht 1.727 m (5' 8\")   Wt 63 kg (139 lb)   LMP 09/01/2005   SpO2 98%   BMI 21.13 kg/m      Neck Cir (cm): 31 cm    [place] Total Score 2/18/2019   Total score - [place] 3       ANABELL Total Score: 4 (02/18/19 0901)    GENERAL APPEARANCE: healthy, " alert, no distress and cooperative  EYES: Eyes grossly normal to inspection, conjunctivae and sclerae normal and left pupil dilated compared to right  HENT: nose and mouth without ulcers or lesions, oral mucous membranes moist and oropharynx clear  NECK: no adenopathy, no asymmetry, masses, or scars, thyroid normal to palpation and trachea midline and normal to palpation  RESP: lungs clear to auscultation - no rales, rhonchi or wheezes  CV: regular rates and rhythm, normal S1 S2, no S3 or S4, no murmur, click or rub and no irregular beats  LYMPHATICS: no cervical adenopathy  MS: extremities normal- no gross deformities noted  NEURO: Normal strength and tone, mentation intact, speech normal and cranial nerves 2-12 intact  Mallampati Class: I.  Tonsillar Stage: 1  hidden by pillars.    Impression/Plan:    (R06.83) Snoring  Comment: Raven is here for management of snoring, which has worsened in the last 2 years. She has recorded her own snoring and said she had to strain to hear it. However, the snoring bothers her . He sometimes leaves the room in the middle of the night because of the snoring. She has tried a number of over the counter treatments but would like a more effective/tolerable option. Theravent is the one thing she tried that worked, but it is uncomfortable. She does not have any signs of apnea, no observed gasps, snorts or pauses. She is not sleepy in the daytime, ESS 3/24. Her risk of apnea is very low, STOP BANG 2; snoring, age >50 (63). She does not have HTN, BMI is <35 (21), neck is <40 cm (31 cm), and gender is female. She would not want to try CPAP. Her airway appears quite patent.  Plan: SLEEP DENTAL REFERRAL        Given her low risk, I do not feel a sleep test is indicated. If she did have mild/moderate apnea, it would be addressed by a dental appliance. She was informed that insurances do not cover treatment for snoring alone. She was also shown mouth exercises that may help reduce  snoring.       Literature provided regarding sleep apnea.      She will follow up with me in the future as needed.       Polysomnography reviewed.  Obstructive sleep apnea reviewed.  Complications of untreated sleep apnea were reviewed.  45 minutes was spent during this visit, over 50% in counseling and coordination of care.   Bennett Goltz, PA-C    CC: Roshni Will

## 2019-02-21 ENCOUNTER — MEDICAL CORRESPONDENCE (OUTPATIENT)
Dept: HEALTH INFORMATION MANAGEMENT | Facility: CLINIC | Age: 64
End: 2019-02-21

## 2019-02-26 ENCOUNTER — TRANSFERRED RECORDS (OUTPATIENT)
Dept: HEALTH INFORMATION MANAGEMENT | Facility: CLINIC | Age: 64
End: 2019-02-26

## 2019-03-08 ENCOUNTER — MYC MEDICAL ADVICE (OUTPATIENT)
Dept: FAMILY MEDICINE | Facility: CLINIC | Age: 64
End: 2019-03-08

## 2019-03-10 ENCOUNTER — HOSPITAL ENCOUNTER (EMERGENCY)
Facility: CLINIC | Age: 64
Discharge: HOME | End: 2019-03-10
Attending: EMERGENCY MEDICINE | Admitting: EMERGENCY MEDICINE
Payer: COMMERCIAL

## 2019-03-10 ENCOUNTER — APPOINTMENT (OUTPATIENT)
Dept: CT IMAGING | Facility: CLINIC | Age: 64
End: 2019-03-10
Attending: EMERGENCY MEDICINE
Payer: COMMERCIAL

## 2019-03-10 VITALS
BODY MASS INDEX: 21.35 KG/M2 | TEMPERATURE: 97.2 F | DIASTOLIC BLOOD PRESSURE: 62 MMHG | SYSTOLIC BLOOD PRESSURE: 111 MMHG | RESPIRATION RATE: 16 BRPM | OXYGEN SATURATION: 96 % | HEIGHT: 67 IN | WEIGHT: 136 LBS

## 2019-03-10 DIAGNOSIS — S06.0X0A CONCUSSION WITHOUT LOSS OF CONSCIOUSNESS, INITIAL ENCOUNTER: ICD-10-CM

## 2019-03-10 DIAGNOSIS — S09.90XA CLOSED HEAD INJURY, INITIAL ENCOUNTER: ICD-10-CM

## 2019-03-10 PROCEDURE — 70450 CT HEAD/BRAIN W/O DYE: CPT

## 2019-03-10 PROCEDURE — 25000132 ZZH RX MED GY IP 250 OP 250 PS 637: Performed by: EMERGENCY MEDICINE

## 2019-03-10 PROCEDURE — 99284 EMERGENCY DEPT VISIT MOD MDM: CPT | Mod: 25

## 2019-03-10 PROCEDURE — 25000131 ZZH RX MED GY IP 250 OP 636 PS 637

## 2019-03-10 RX ORDER — ACETAMINOPHEN 325 MG/1
650 TABLET ORAL ONCE
Status: COMPLETED | OUTPATIENT
Start: 2019-03-10 | End: 2019-03-10

## 2019-03-10 RX ORDER — ONDANSETRON 4 MG/1
4 TABLET, ORALLY DISINTEGRATING ORAL EVERY 6 HOURS PRN
Qty: 10 TABLET | Refills: 0 | Status: SHIPPED | OUTPATIENT
Start: 2019-03-10 | End: 2019-03-13

## 2019-03-10 RX ORDER — ONDANSETRON 4 MG/1
4 TABLET, ORALLY DISINTEGRATING ORAL ONCE
Status: COMPLETED | OUTPATIENT
Start: 2019-03-10 | End: 2019-03-10

## 2019-03-10 RX ORDER — ONDANSETRON 4 MG/1
TABLET, ORALLY DISINTEGRATING ORAL
Status: COMPLETED
Start: 2019-03-10 | End: 2019-03-10

## 2019-03-10 RX ADMIN — ONDANSETRON 4 MG: 4 TABLET, ORALLY DISINTEGRATING ORAL at 21:16

## 2019-03-10 RX ADMIN — ACETAMINOPHEN 650 MG: 325 TABLET, FILM COATED ORAL at 21:20

## 2019-03-10 ASSESSMENT — ENCOUNTER SYMPTOMS
NECK PAIN: 0
VOMITING: 0
NAUSEA: 1
HEADACHES: 1

## 2019-03-10 ASSESSMENT — MIFFLIN-ST. JEOR: SCORE: 1204.52

## 2019-03-10 NOTE — ED AVS SNAPSHOT
Emergency Department  [address] [place] [address]  Phone:  [phone]  Fax:  [phone]                                    Raven Mahmood   MRN: [id number]    Department:   Emergency Department   Date of Visit:  3/10/2019           After Visit Summary Signature Page    I have received my discharge instructions, and my questions have been answered. I have discussed any challenges I see with this plan with the nurse or doctor.    ..........................................................................................................................................  Patient/Patient Representative Signature      ..........................................................................................................................................  Patient Representative Print Name and Relationship to Patient    ..................................................               ................................................  Date                                   Time    ..........................................................................................................................................  Reviewed by Signature/Title    ...................................................              ..............................................  Date                                               Time          22EPIC Rev 08/18

## 2019-03-11 NOTE — ED PROVIDER NOTES
"  History     Chief Complaint:  Head Injury    HPI   Raven Mahmood is a 64 year old female with a history of osteopenia who presents to the ED for evaluation after a fall. The patient reports that she was walking outside around 2030 this evening, when she slipped on some ice and fell backwards, hitting her occipital scalp. She did not lose consciouseness. She has had some nausea and a headache secondary to the incident, so she presented to the ED for evaluation. Here in the ED, she denies any neck pain or vomiting. Of note, she is not anticoagulated.    Allergies:  Erythromycin  Penicillins  Tetracycline    Medications:    Flonase    Past Medical History:    Dysplasia  Female stress incontinence  Hemorrhoids  IBS  Osteopenia  TMJ  Tinnitus  Uterine prolapse  Cystocele    Past Surgical History:    DaVinci Hysterectomy  DaVinci Lysis of adhesions  DaVinci Sacrocolpopexy, Cystoscopy  Salpingo-oophorectomy  EGD  Stress echo    Family History:    CVD  Pulmonary fibrosis    Social History:  Marital Status:   [2]  Negative for tobacco use.  Alcohol use: yes  Negative for drug use.     Review of Systems   Gastrointestinal: Positive for nausea. Negative for vomiting.   Musculoskeletal: Negative for neck pain.   Neurological: Positive for headaches.        Negative LOC   All other systems reviewed and are negative.      Physical Exam     Patient Vitals for the past 24 hrs:   BP Temp Temp src Heart Rate Resp SpO2 Height Weight   03/10/19 2101 104/66 97.2  F (36.2  C) Temporal 68 16 100 % 1.702 m (5' 7\") 61.7 kg (136 lb)     Physical Exam  Nursing note and vitals reviewed.  Constitutional:  Oriented to person, place, and time. Cooperative.   HENT:    Small contusion to the left occipital region of the scalp.  Nose:    Nose normal.   Mouth/Throat:   Mucous membranes are normal.   Eyes:    Conjunctivae normal and EOM are normal.      Pupils are equal, round, and reactive to light.   Neck:    Trachea normal. No cervical " spine tenderness to palpation.  Cardiovascular:  Normal rate, regular rhythm, normal heart sounds and normal pulses. No murmur heard.  Pulmonary/Chest:  Effort normal and breath sounds normal.   Abdominal:   Soft. Normal appearance and bowel sounds are normal.      There is no tenderness.      There is no rebound and no CVA tenderness.   Musculoskeletal:  Extremities atraumatic x 4.   Lymphadenopathy:  No cervical adenopathy.   Neurological:   Alert and oriented to person, place, and time. Normal strength.      No cranial nerve deficit or sensory deficit. GCS eye subscore is 4. GCS verbal subscore is 5. GCS motor subscore is 6.   Skin:    Skin is intact. No rash noted.   Psychiatric:   Slightly anxious and tearful.    Emergency Department Course     Imaging:  Radiographic findings were communicated with the patient who voiced understanding of the findings.  CT Head without contrast:   No fracture or intracranial hemorrhage, as per radiology.    Interventions:  2116 Zofran, 4 mg, PO  2120 Tylenol 650 mg PO    Emergency Department Course:  Nursing notes and vitals reviewed. (2119) I performed an exam of the patient as documented above.     Medicine administered as documented above.    The patient was sent for a Head CT while in the emergency department, findings above.     (2200) I rechecked the patient and discussed the results of her workup thus far.     Findings and plan explained to the Patient. Patient discharged home with instructions regarding supportive care, medications, and reasons to return. The importance of close follow-up was reviewed. The patient was prescribed Zofran.    I personally reviewed the results with the Patient and answered all related questions prior to discharge.     Impression & Plan      Medical Decision Making:  Raven Mahmood is a 64 year old female who came in after she slipped on the ice and fell hitting the back of her head.  Based on the mechanism as well as her symptoms, I felt it  was reasonable to obtain a CT scan of her head to rule out an intracranial hemorrhage and/or skull fracture.  She was also provided Zofran for her nausea.  She has no neck pain, and therefore I do not feel the need to image her neck.  Her CT scan is unremarkable, which is reassuring.  I explained to her that she could certainly have a concussion now and the ramifications surrounding that diagnosis.  I recommended using ibuprofen or Tylenol at home as well as ice.  I will send her home with a prescription for Zofran as well in case she has ongoing nausea.  She is to follow-up with her physician if she is not improving in a timely fashion.  She should return here with any concerns or worsening symptoms as well.    Diagnosis:    ICD-10-CM    1. Closed head injury, initial encounter S09.90XA    2. Concussion without loss of consciousness, initial encounter S06.0X0A      Disposition:  discharged to home    Discharge Medications:     Medication List      Started    ondansetron 4 MG ODT tab  Commonly known as:  ZOFRAN ODT  4 mg, Oral, EVERY 6 HOURS PRN          Scribe Disclosure:  I, Maricruz Atwood, am serving as a scribe on 3/10/2019 at 9:33 PM to personally document services performed by Abhijit Main MD based on my observations and the provider's statements to me.     Maricruz Atwood  3/10/2019    EMERGENCY DEPARTMENT       Abhijit Main MD  03/10/19 [id number]

## 2019-03-11 NOTE — TELEPHONE ENCOUNTER
Dr. Will-Please review.  Of note, writer does not find macular degeneration on problem list nor under medical history.    Thank you!  PERCY OteroN, RN

## 2019-09-28 ENCOUNTER — HEALTH MAINTENANCE LETTER (OUTPATIENT)
Age: 64
End: 2019-09-28

## 2020-02-03 ENCOUNTER — OFFICE VISIT (OUTPATIENT)
Dept: FAMILY MEDICINE | Facility: CLINIC | Age: 65
End: 2020-02-03
Payer: COMMERCIAL

## 2020-02-03 VITALS
WEIGHT: 138 LBS | SYSTOLIC BLOOD PRESSURE: 108 MMHG | BODY MASS INDEX: 20.92 KG/M2 | TEMPERATURE: 97.5 F | OXYGEN SATURATION: 99 % | RESPIRATION RATE: 14 BRPM | HEART RATE: 68 BPM | DIASTOLIC BLOOD PRESSURE: 64 MMHG | HEIGHT: 68 IN

## 2020-02-03 DIAGNOSIS — Z13.0 SCREENING FOR ENDOCRINE, NUTRITIONAL, METABOLIC AND IMMUNITY DISORDER: ICD-10-CM

## 2020-02-03 DIAGNOSIS — Z00.00 ROUTINE GENERAL MEDICAL EXAMINATION AT A HEALTH CARE FACILITY: Primary | ICD-10-CM

## 2020-02-03 DIAGNOSIS — Z13.21 SCREENING FOR ENDOCRINE, NUTRITIONAL, METABOLIC AND IMMUNITY DISORDER: ICD-10-CM

## 2020-02-03 DIAGNOSIS — Z13.29 SCREENING FOR ENDOCRINE, NUTRITIONAL, METABOLIC AND IMMUNITY DISORDER: ICD-10-CM

## 2020-02-03 DIAGNOSIS — Z13.228 SCREENING FOR ENDOCRINE, NUTRITIONAL, METABOLIC AND IMMUNITY DISORDER: ICD-10-CM

## 2020-02-03 DIAGNOSIS — M85.80 OSTEOPENIA, UNSPECIFIED LOCATION: ICD-10-CM

## 2020-02-03 DIAGNOSIS — Z13.1 SCREENING FOR DIABETES MELLITUS: ICD-10-CM

## 2020-02-03 LAB
GLUCOSE SERPL-MCNC: 82 MG/DL (ref 70–99)
VIT B12 SERPL-MCNC: 640 PG/ML (ref 193–986)

## 2020-02-03 PROCEDURE — 99396 PREV VISIT EST AGE 40-64: CPT | Performed by: FAMILY MEDICINE

## 2020-02-03 PROCEDURE — 82607 VITAMIN B-12: CPT | Performed by: FAMILY MEDICINE

## 2020-02-03 PROCEDURE — 36415 COLL VENOUS BLD VENIPUNCTURE: CPT | Performed by: FAMILY MEDICINE

## 2020-02-03 PROCEDURE — 82947 ASSAY GLUCOSE BLOOD QUANT: CPT | Performed by: FAMILY MEDICINE

## 2020-02-03 PROCEDURE — 82306 VITAMIN D 25 HYDROXY: CPT | Performed by: FAMILY MEDICINE

## 2020-02-03 ASSESSMENT — ENCOUNTER SYMPTOMS
DIARRHEA: 0
ABDOMINAL PAIN: 0
NERVOUS/ANXIOUS: 0
FEVER: 0
HEMATOCHEZIA: 0
EYE PAIN: 0
HEMATURIA: 0
DIZZINESS: 0
COUGH: 0
CONSTIPATION: 0
CHILLS: 0

## 2020-02-03 ASSESSMENT — MIFFLIN-ST. JEOR: SCORE: 1221.52

## 2020-02-03 NOTE — PROGRESS NOTES
SUBJECTIVE:   CC: Raven Mahmood is an 64 year old woman who presents for preventive health visit.     Healthy Habits:     Getting at least 3 servings of Calcium per day:  Yes    Bi-annual eye exam:  Yes    Dental care twice a year:  Yes    Sleep apnea or symptoms of sleep apnea:  Excessive snoring    Diet:  Regular (no restrictions)    Frequency of exercise:  2-3 days/week    Duration of exercise:  15-30 minutes    Taking medications regularly:  Yes    Medication side effects:  None    PHQ-2 Total Score: 0    Additional concerns today:  Yes    Had sleep study - negative.  Uses mandibular advancement device for snoring only.      Unsure if she should take vitamin supplements for macular degeneration - strong FHX of mac degen.    Will get mammo done on [facility] as she is traveling    Getting close to [facility] and then plans to move out to [place], [place].    Today's PHQ-2 Score:   PHQ-2 ( 1999 [employer]) 2/3/2020   Q1: Little interest or pleasure in doing things 0   Q2: Feeling down, depressed or hopeless 0   PHQ-2 Score 0   Q1: Little interest or pleasure in doing things Not at all   Q2: Feeling down, depressed or hopeless Not at all   PHQ-2 Score 0       Abuse: Current or Past(Physical, Sexual or Emotional)- No  Do you feel safe in your environment? Yes    Have you ever done Advance Care Planning? (For example, a Health Directive, POLST, or a discussion with a medical provider or your loved ones about your wishes): Yes, patient states has an Advance Care Planning document and will bring a copy to the clinic.    Social History     Tobacco Use     Smoking status: Never Smoker     Smokeless tobacco: Never Used   Substance Use Topics     Alcohol use: Yes     Comment: 2 drinks a week at most         Alcohol Use 2/3/2020   Prescreen: >3 drinks/day or >7 drinks/week? No   Prescreen: >3 drinks/day or >7 drinks/week? -       Reviewed orders with patient.  Reviewed health maintenance and updated orders accordingly -  Yes  BP Readings from Last 3 Encounters:   02/03/20 108/64   03/10/19 111/62   02/18/19 98/61    Wt Readings from Last 3 Encounters:   02/03/20 62.6 kg (138 lb)   03/10/19 61.7 kg (136 lb)   02/18/19 63 kg (139 lb)           Pertinent mammograms are reviewed under the imaging tab.    History of abnormal Pap smear: NO - age 30-65 PAP every 5 years with negative HPV co-testing recommended  PAP / HPV Latest Ref Rng & Units 1/16/2019 1/16/2019 1/16/2019   PAP - - NIL NIL   HPV 16 DNA NEG:Negative Negative - -   HPV 18 DNA NEG:Negative Negative - -   OTHER HR HPV NEG:Negative Negative - -     Reviewed and updated as needed this visit by clinical staff  Tobacco  Allergies  Meds  Problems  Med Hx  Surg Hx  Fam Hx  Soc Hx          Reviewed and updated as needed this visit by Provider  Tobacco  Allergies  Meds  Problems  Med Hx  Surg Hx  Fam Hx        Past Medical History:   Diagnosis Date     Dysplasia of cervix, unspecified     cervical dysplasia - in [facility]     Female stress incontinence     mild stress incontinence     Hemorrhoids      Irritable bowel syndrome      Mucous polyp of cervix     cervical polyp 12:00, seen by GYN     Elizabeth 8/06    DEXA     Other specified temporomandibular joint disorders      Pelvic floor relaxation     pessary     Tinnitus 11/06    Dr. Conti      Past Surgical History:   Procedure Laterality Date     COLONOSCOPY  12/17/07    WNL, sister with polyps     COLONOSCOPY  1/17/2013    Procedure: COLONOSCOPY;  COLONOSCOPY;  Surgeon: Roland Gee MD;  Location:  GI     DAVINCI HYSTERECTOMY SUPRACERVICAL N/A 7/21/2017    Procedure: DAVINCI XI HYSTERECTOMY SUPRACERVICAL;  DAVINCI XI ROBOTIC ASSISTED SUPRACERVICAL LAPAROSCOPIC HYSTERECTOMY, BILATERAL SALPINGO-OOPHORECTOMIES (DR. WINSTON) DAVINCI XI ROBOTIC ASSISTED SACROCOLPOPEXY, LYSIS OF ADHESIONS, MIDURETHRAL SLING, CYSTOSCOPY (SANJIV);  Surgeon: Payam Winston MD;  Location:  OR     DAVINCI LYSIS OF  "ADHESIONS N/A 2017    Procedure: DAVINCI LYSIS OF ADHESIONS;;  Surgeon: Payam Ceballos MD;  Location:  OR     DAVINCI SACROCOLPOPEXY, MIDURETHRAL SLING, CYSTOSCOPY  2017    Procedure: DAVINCI XI SACROCOLPOPEXY, MIDURETHRAL SLING, CYSTOSCOPY;;  Surgeon: Minnie Bailey MD;  Location:  OR     DAVINCI SALPINGO-OOPHORECTOMY INCLUDING BILATERAL N/A 2017    Procedure: DAVINCI XI SALPINGO-OOPHORECTOMY INCLUDING BILATERAL;;  Surgeon: Payam Ceballos MD;  Location:  OR     ESOPHAGOSCOPY, GASTROSCOPY, DUODENOSCOPY (EGD), COMBINED N/A 2016    Procedure: COMBINED ESOPHAGOSCOPY, GASTROSCOPY, DUODENOSCOPY (EGD), BIOPSY SINGLE OR MULTIPLE;  Surgeon: Sergio Nguyễn MD;  Location:  GI     STRESS ECHO (METRO)      negative, at [facility]     OB History    Para Term  AB Living   1 1 1 0 0 1   SAB TAB Ectopic Multiple Live Births   0 0 0 0 0      # Outcome Date GA Lbr Jarret/2nd Weight Sex Delivery Anes PTL Lv   1 Term               Obstetric Comments    x 1       Review of Systems   Constitutional: Negative for chills and fever.   HENT: Negative for congestion and ear pain.    Eyes: Negative for pain.   Respiratory: Negative for cough.    Cardiovascular: Negative for chest pain.   Gastrointestinal: Negative for abdominal pain, constipation, diarrhea and hematochezia.   Endocrine: Negative for polyuria.   Genitourinary: Negative for hematuria.   Neurological: Negative for dizziness.   Psychiatric/Behavioral: The patient is not nervous/anxious.        OBJECTIVE:   /64 (BP Location: Right arm, Patient Position: Sitting, Cuff Size: Adult Regular)   Pulse 68   Temp 97.5  F (36.4  C) (Oral)   Resp 14   Ht 1.715 m (5' 7.5\")   Wt 62.6 kg (138 lb)   LMP 2005   SpO2 99%   Breastfeeding No   BMI 21.29 kg/m    Physical Exam  GENERAL APPEARANCE: healthy, alert and no distress  EYES: Eyes grossly normal to inspection, conjunctivae and sclerae normal  HENT: ear canals " and TM's normal, nose and mouth without ulcers or lesions, oropharynx clear and oral mucous membranes moist  NECK: no adenopathy, no asymmetry, masses, or scars and thyroid normal to palpation  RESP: lungs clear to auscultation - no rales, rhonchi or wheezes  BREAST: normal without masses, tenderness or nipple discharge and no palpable axillary masses or adenopathy  CV: regular rate and rhythm, normal S1 S2, no S3 or S4, no murmur, click or rub, no peripheral edema and peripheral pulses strong  ABDOMEN: soft, nontender, no hepatosplenomegaly, no masses   MS: no musculoskeletal defects are noted and gait is age appropriate without ataxia  SKIN: no suspicious lesions or rashes  NEURO: Normal strength and tone, sensory exam grossly normal, mentation intact and speech normal  PSYCH: mentation appears normal and affect normal/bright    Diagnostic Test Results:  Labs reviewed in Epic    ASSESSMENT/PLAN:       ICD-10-CM    1. Routine general medical examination at a health care facility Z00.00    2. Osteopenia, unspecified location M85.80 Vitamin D Deficiency   3. Screening for endocrine, nutritional, metabolic and immunity disorder Z13.29 Vitamin B12    Z13.21     Z13.228     Z13.0    4. Screening for diabetes mellitus Z13.1 Glucose       COUNSELING:  Reviewed preventive health counseling, as reflected in patient instructions        Counseling Resources:  ATP IV Guidelines  Pooled Cohorts Equation Calculator  Breast Cancer Risk Calculator  FRAX Risk Assessment  ICSI Preventive Guidelines  Dietary Guidelines for Americans, 2010  USDA's MyPlate  ASA Prophylaxis  Lung CA Screening    Roshni Will MD  [facility]

## 2020-02-04 LAB — DEPRECATED CALCIDIOL+CALCIFEROL SERPL-MC: 28 UG/L (ref 20–75)

## 2020-02-04 NOTE — RESULT ENCOUNTER NOTE
Alden Carbajal,  Your B12 level and fasting blood sugar are normal.  Your Vitamin D level is adequate - a bit on the low side but ok (especially for the middle of the winter).    Roshni Will MD

## 2021-01-10 ENCOUNTER — HEALTH MAINTENANCE LETTER (OUTPATIENT)
Age: 66
End: 2021-01-10

## 2021-05-08 ENCOUNTER — HEALTH MAINTENANCE LETTER (OUTPATIENT)
Age: 66
End: 2021-05-08

## 2021-10-23 ENCOUNTER — HEALTH MAINTENANCE LETTER (OUTPATIENT)
Age: 66
End: 2021-10-23

## 2021-10-23 ENCOUNTER — MYC MEDICAL ADVICE (OUTPATIENT)
Dept: FAMILY MEDICINE | Facility: CLINIC | Age: 66
End: 2021-10-23

## 2021-10-26 NOTE — TELEPHONE ENCOUNTER
"Dr. Will- please review at your convenience      \"I wanted to let you and your staff know that our entire family has moved out of [place].  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  I've enjoyed your competence, sense of humor, and all around common sense, and hope I can find a GP here and [place] who can measure up!\"    JOSE Sen RN  [facility]    "

## 2022-06-04 ENCOUNTER — HEALTH MAINTENANCE LETTER (OUTPATIENT)
Age: 67
End: 2022-06-04

## 2022-10-10 ENCOUNTER — HEALTH MAINTENANCE LETTER (OUTPATIENT)
Age: 67
End: 2022-10-10

## 2024-03-16 ENCOUNTER — HEALTH MAINTENANCE LETTER (OUTPATIENT)
Age: 69
End: 2024-03-16

## (undated) DEVICE — GLOVE PROTEXIS BLUE W/NEU-THERA 8.5  2D73EB85

## (undated) DEVICE — SU VICRYL 2-0 SH 27" J317H

## (undated) DEVICE — GLOVE PROTEXIS W/NEU-THERA 7.5  2D73TE75

## (undated) DEVICE — DAVINCI XI SEAL UNIVERSAL 5-8MM 470361

## (undated) DEVICE — SOL NACL 0.9% IRRIG 1000ML BOTTLE 07138-09

## (undated) DEVICE — KIT PATIENT POSITIONING PIGAZZI LATEX FREE 40580

## (undated) DEVICE — GLOVE PROTEXIS MICRO 8.0  2D73PM80

## (undated) DEVICE — SU VICRYL 4-0 PS-2 18" UND J496H

## (undated) DEVICE — ENDO TROCAR CONMED AIRSEAL BLADELESS 08X120MM IAS8-120LP

## (undated) DEVICE — DAVINCI TROCAR 8MM BLADELESS 470357

## (undated) DEVICE — NDL INSUFFLATION 120MM VERRES 172015

## (undated) DEVICE — RETR ELEV / UTERINE MANIPULATOR V-CARE LG CUP 60-6085-202

## (undated) DEVICE — GLOVE PROTEXIS MICRO 7.0  2D73PM70

## (undated) DEVICE — SU ETHIBOND 2-0 SHDA 30" X563H

## (undated) DEVICE — SUCTION CANISTER MEDIVAC LINER 3000ML W/LID 65651-530

## (undated) DEVICE — ESU PENCIL W/HOLSTER E2350H

## (undated) DEVICE — WIPES FOLEY CARE SURESTEP PROVON DFC100

## (undated) DEVICE — DAVINCI XI DRAPE ARM 470015

## (undated) DEVICE — SU VICRYL 0 UR-6 27" J603H

## (undated) DEVICE — PACK DAVINCI GYN SMA15GDFS1

## (undated) DEVICE — TUBING IRRIG TUR Y TYPE 96" LF 6543-01

## (undated) DEVICE — SUCTION IRR TRUMPET VALVE LAP ASU1201

## (undated) DEVICE — DAVINCI HOT SHEARS TIP COVER  400180

## (undated) DEVICE — ENDO POUCH UNIVERSAL RETRIEVAL SYSTEM INZII 5MM CD003

## (undated) DEVICE — GLOVE PROTEXIS BLUE W/NEU-THERA 7.0  2D73EB70

## (undated) DEVICE — BLADE CLIPPER 4406

## (undated) DEVICE — TUBING CONMED AIRSEAL SMOKE EVAC INSUFFLATION ASM-EVAC

## (undated) DEVICE — Device

## (undated) DEVICE — LINEN TOWEL PACK X5 5464

## (undated) DEVICE — SU VICRYL 2-0 CT-2 27" J333H

## (undated) DEVICE — DAVINCI XI VESSEL SEALER 480322

## (undated) DEVICE — CATH TRAY FOLEY SURESTEP 16FR WDRAIN BAG STLK LATEX A300316A

## (undated) DEVICE — ESU GROUND PAD UNIVERSAL W/O CORD

## (undated) DEVICE — SOL NACL 0.9% INJ 1000ML BAG 2B1324X

## (undated) DEVICE — SU PROLENE 2-0 V-7 36" 8977H

## (undated) DEVICE — SU MONOCRYL 4-0 PS-2 18" UND Y496G

## (undated) DEVICE — GOWN IMPERVIOUS SPECIALTY XL/XLONG 39049

## (undated) DEVICE — ENDO TROCAR CONMED AIRSEAL BLADELESS 05X120MM IAS5-120LP

## (undated) DEVICE — GLOVE PROTEXIS W/NEU-THERA 6.5  2D73TE65

## (undated) DEVICE — DAVINCI XI DRAPE COLUMN 470341

## (undated) RX ORDER — BUPIVACAINE HYDROCHLORIDE AND EPINEPHRINE 5; 5 MG/ML; UG/ML
INJECTION, SOLUTION EPIDURAL; INTRACAUDAL; PERINEURAL
Status: DISPENSED
Start: 2017-07-21

## (undated) RX ORDER — FENTANYL CITRATE 50 UG/ML
INJECTION, SOLUTION INTRAMUSCULAR; INTRAVENOUS
Status: DISPENSED
Start: 2017-07-21

## (undated) RX ORDER — BUPIVACAINE HYDROCHLORIDE AND EPINEPHRINE 2.5; 5 MG/ML; UG/ML
INJECTION, SOLUTION EPIDURAL; INFILTRATION; INTRACAUDAL; PERINEURAL
Status: DISPENSED
Start: 2017-07-21

## (undated) RX ORDER — GLYCOPYRROLATE 0.2 MG/ML
INJECTION, SOLUTION INTRAMUSCULAR; INTRAVENOUS
Status: DISPENSED
Start: 2017-07-21

## (undated) RX ORDER — DEXAMETHASONE SODIUM PHOSPHATE 4 MG/ML
INJECTION, SOLUTION INTRA-ARTICULAR; INTRALESIONAL; INTRAMUSCULAR; INTRAVENOUS; SOFT TISSUE
Status: DISPENSED
Start: 2017-07-21

## (undated) RX ORDER — PROPOFOL 10 MG/ML
INJECTION, EMULSION INTRAVENOUS
Status: DISPENSED
Start: 2017-07-21

## (undated) RX ORDER — CLINDAMYCIN PHOSPHATE 900 MG/50ML
INJECTION, SOLUTION INTRAVENOUS
Status: DISPENSED
Start: 2017-07-21

## (undated) RX ORDER — LIDOCAINE HYDROCHLORIDE 20 MG/ML
INJECTION, SOLUTION EPIDURAL; INFILTRATION; INTRACAUDAL; PERINEURAL
Status: DISPENSED
Start: 2017-07-21

## (undated) RX ORDER — HYDROMORPHONE HYDROCHLORIDE 1 MG/ML
INJECTION, SOLUTION INTRAMUSCULAR; INTRAVENOUS; SUBCUTANEOUS
Status: DISPENSED
Start: 2017-07-21

## (undated) RX ORDER — ONDANSETRON 2 MG/ML
INJECTION INTRAMUSCULAR; INTRAVENOUS
Status: DISPENSED
Start: 2017-07-21

## (undated) RX ORDER — PHENAZOPYRIDINE HYDROCHLORIDE 200 MG/1
TABLET, FILM COATED ORAL
Status: DISPENSED
Start: 2017-07-21